# Patient Record
Sex: FEMALE | Race: WHITE | NOT HISPANIC OR LATINO | URBAN - METROPOLITAN AREA
[De-identification: names, ages, dates, MRNs, and addresses within clinical notes are randomized per-mention and may not be internally consistent; named-entity substitution may affect disease eponyms.]

---

## 2023-02-24 LAB
ALANINE AMINOTRANSFERASE (SGPT) (U/L) IN SER/PLAS: 12 U/L (ref 7–45)
ALBUMIN (G/DL) IN SER/PLAS: 4.6 G/DL (ref 3.4–5)
ALKALINE PHOSPHATASE (U/L) IN SER/PLAS: 37 U/L (ref 33–110)
ANION GAP IN SER/PLAS: 12 MMOL/L (ref 10–20)
ASPARTATE AMINOTRANSFERASE (SGOT) (U/L) IN SER/PLAS: 18 U/L (ref 9–39)
BASOPHILS (10*3/UL) IN BLOOD BY AUTOMATED COUNT: 0.05 X10E9/L (ref 0–0.1)
BASOPHILS/100 LEUKOCYTES IN BLOOD BY AUTOMATED COUNT: 1.2 % (ref 0–2)
BILIRUBIN TOTAL (MG/DL) IN SER/PLAS: 0.7 MG/DL (ref 0–1.2)
CALCIUM (MG/DL) IN SER/PLAS: 9.5 MG/DL (ref 8.6–10.6)
CARBON DIOXIDE, TOTAL (MMOL/L) IN SER/PLAS: 28 MMOL/L (ref 21–32)
CHLORIDE (MMOL/L) IN SER/PLAS: 102 MMOL/L (ref 98–107)
COBALAMIN (VITAMIN B12) (PG/ML) IN SER/PLAS: 838 PG/ML (ref 211–911)
CORTISOL (UG/DL) IN SERUM - AM: 6.9 UG/DL (ref 5–20)
CORTISOL (UG/DL) IN SERUM- PM: NORMAL
CREATININE (MG/DL) IN SER/PLAS: 0.64 MG/DL (ref 0.5–1.05)
EOSINOPHILS (10*3/UL) IN BLOOD BY AUTOMATED COUNT: 0.15 X10E9/L (ref 0–0.7)
EOSINOPHILS/100 LEUKOCYTES IN BLOOD BY AUTOMATED COUNT: 3.6 % (ref 0–6)
ERYTHROCYTE DISTRIBUTION WIDTH (RATIO) BY AUTOMATED COUNT: 12 % (ref 11.5–14.5)
ERYTHROCYTE MEAN CORPUSCULAR HEMOGLOBIN CONCENTRATION (G/DL) BY AUTOMATED: 32.8 G/DL (ref 32–36)
ERYTHROCYTE MEAN CORPUSCULAR VOLUME (FL) BY AUTOMATED COUNT: 88 FL (ref 80–100)
ERYTHROCYTES (10*6/UL) IN BLOOD BY AUTOMATED COUNT: 4.51 X10E12/L (ref 4–5.2)
GFR FEMALE: >90 ML/MIN/1.73M2
GLUCOSE (MG/DL) IN SER/PLAS: 85 MG/DL (ref 74–99)
HEMATOCRIT (%) IN BLOOD BY AUTOMATED COUNT: 39.9 % (ref 36–46)
HEMOGLOBIN (G/DL) IN BLOOD: 13.1 G/DL (ref 12–16)
IMMATURE GRANULOCYTES/100 LEUKOCYTES IN BLOOD BY AUTOMATED COUNT: 0.2 % (ref 0–0.9)
IRON (UG/DL) IN SER/PLAS: 74 UG/DL (ref 35–150)
IRON BINDING CAPACITY (UG/DL) IN SER/PLAS: 357 UG/DL (ref 240–445)
IRON SATURATION (%) IN SER/PLAS: 21 % (ref 25–45)
LEUKOCYTES (10*3/UL) IN BLOOD BY AUTOMATED COUNT: 4.2 X10E9/L (ref 4.4–11.3)
LYMPHOCYTES (10*3/UL) IN BLOOD BY AUTOMATED COUNT: 1.69 X10E9/L (ref 1.2–4.8)
LYMPHOCYTES/100 LEUKOCYTES IN BLOOD BY AUTOMATED COUNT: 40.6 % (ref 13–44)
MONOCYTES (10*3/UL) IN BLOOD BY AUTOMATED COUNT: 0.3 X10E9/L (ref 0.1–1)
MONOCYTES/100 LEUKOCYTES IN BLOOD BY AUTOMATED COUNT: 7.2 % (ref 2–10)
NEUTROPHILS (10*3/UL) IN BLOOD BY AUTOMATED COUNT: 1.96 X10E9/L (ref 1.2–7.7)
NEUTROPHILS/100 LEUKOCYTES IN BLOOD BY AUTOMATED COUNT: 47.2 % (ref 40–80)
NRBC (PER 100 WBCS) BY AUTOMATED COUNT: 0 /100 WBC (ref 0–0)
PLATELETS (10*3/UL) IN BLOOD AUTOMATED COUNT: 358 X10E9/L (ref 150–450)
POTASSIUM (MMOL/L) IN SER/PLAS: 5 MMOL/L (ref 3.5–5.3)
PROTEIN TOTAL: 7.1 G/DL (ref 6.4–8.2)
SODIUM (MMOL/L) IN SER/PLAS: 137 MMOL/L (ref 136–145)
THYROPEROXIDASE AB (IU/ML) IN SER/PLAS: <28 IU/ML
THYROTROPIN (MIU/L) IN SER/PLAS BY DETECTION LIMIT <= 0.05 MIU/L: 1.17 MIU/L (ref 0.44–3.98)
UREA NITROGEN (MG/DL) IN SER/PLAS: 10 MG/DL (ref 6–23)

## 2023-02-25 LAB — CORTISOL (UG/DL) IN SERUM- PM: 4.9 UG/DL (ref 2.5–10)

## 2023-02-27 LAB — ZINC,SERUM OR PLASMA: 72.8 UG/DL (ref 60–120)

## 2023-02-28 LAB — VITAMIN B6: 313.3 NMOL/L (ref 20–125)

## 2023-03-01 LAB — VITAMIN B1, WHOLE BLOOD: 122 NMOL/L (ref 70–180)

## 2023-08-17 ENCOUNTER — HOSPITAL ENCOUNTER (OUTPATIENT)
Dept: DATA CONVERSION | Facility: HOSPITAL | Age: 33
Discharge: HOME | End: 2023-08-17
Payer: COMMERCIAL

## 2023-08-17 DIAGNOSIS — R87.623 HIGH GRADE SQUAMOUS INTRAEPITHELIAL LESION ON CYTOLOGIC SMEAR OF VAGINA (HGSIL): ICD-10-CM

## 2023-09-07 ENCOUNTER — HOSPITAL ENCOUNTER (OUTPATIENT)
Dept: DATA CONVERSION | Facility: HOSPITAL | Age: 33
Discharge: HOME | End: 2023-09-07
Payer: COMMERCIAL

## 2023-09-07 DIAGNOSIS — R39.9 UNSPECIFIED SYMPTOMS AND SIGNS INVOLVING THE GENITOURINARY SYSTEM: ICD-10-CM

## 2023-09-07 LAB
BACTERIA SPEC CULT: NORMAL
CC # UR: NORMAL /UL
REPORT STATUS -LH SQ DATA CONVERSION: NORMAL
SERVICE CMNT-IMP: NORMAL
SPECIMEN SOURCE: NORMAL

## 2023-12-11 PROBLEM — R13.19 ESOPHAGEAL DYSPHAGIA: Status: ACTIVE | Noted: 2023-12-11

## 2023-12-11 PROBLEM — R09.81 NASAL CONGESTION WITH RHINORRHEA: Status: ACTIVE | Noted: 2023-12-11

## 2023-12-11 PROBLEM — E55.9 VITAMIN D DEFICIENCY: Status: ACTIVE | Noted: 2023-12-11

## 2023-12-11 PROBLEM — F41.9 ANXIETY: Status: ACTIVE | Noted: 2023-12-11

## 2023-12-11 PROBLEM — K21.9 GASTRIC REFLUX: Status: ACTIVE | Noted: 2023-12-11

## 2023-12-11 PROBLEM — D62 ANEMIA DUE TO ACUTE BLOOD LOSS: Status: ACTIVE | Noted: 2023-12-11

## 2023-12-11 PROBLEM — R53.83 FATIGUE: Status: ACTIVE | Noted: 2023-12-11

## 2023-12-11 PROBLEM — N23 URINARY TRACT PAIN: Status: ACTIVE | Noted: 2023-12-11

## 2023-12-11 PROBLEM — R52 GENERALIZED BODY ACHES: Status: ACTIVE | Noted: 2023-12-11

## 2023-12-11 PROBLEM — J01.41 ACUTE RECURRENT PANSINUSITIS: Status: ACTIVE | Noted: 2023-12-11

## 2023-12-11 PROBLEM — E86.0 DEHYDRATION: Status: RESOLVED | Noted: 2023-12-11 | Resolved: 2023-12-11

## 2023-12-11 PROBLEM — J34.89 NASAL CONGESTION WITH RHINORRHEA: Status: ACTIVE | Noted: 2023-12-11

## 2023-12-11 PROBLEM — N39.0 ACUTE LOWER URINARY TRACT INFECTION: Status: ACTIVE | Noted: 2023-12-11

## 2023-12-11 PROBLEM — J30.9 ALLERGIC RHINITIS: Status: ACTIVE | Noted: 2023-12-11

## 2023-12-11 PROBLEM — N39.0 RECURRENT URINARY TRACT INFECTION: Status: ACTIVE | Noted: 2023-12-11

## 2023-12-11 PROBLEM — J01.90 ACUTE SINUSITIS: Status: ACTIVE | Noted: 2023-12-11

## 2023-12-11 PROBLEM — R05.9 COUGH: Status: ACTIVE | Noted: 2023-12-11

## 2023-12-11 PROBLEM — B37.31 VAGINAL CANDIDIASIS: Status: ACTIVE | Noted: 2023-12-11

## 2023-12-11 PROBLEM — Z86.16 HISTORY OF COVID-19: Status: RESOLVED | Noted: 2023-12-11 | Resolved: 2023-12-11

## 2023-12-11 PROBLEM — N94.6 DYSMENORRHEA: Status: ACTIVE | Noted: 2023-12-11

## 2023-12-11 PROBLEM — L30.9 ECZEMA: Status: ACTIVE | Noted: 2023-12-11

## 2023-12-11 PROBLEM — Z20.822 SUSPECTED COVID-19 VIRUS INFECTION: Status: RESOLVED | Noted: 2023-12-11 | Resolved: 2023-12-11

## 2023-12-11 PROBLEM — F32.A DEPRESSION: Status: ACTIVE | Noted: 2023-12-11

## 2023-12-18 ENCOUNTER — TELEPHONE (OUTPATIENT)
Dept: PRIMARY CARE | Facility: CLINIC | Age: 33
End: 2023-12-18
Payer: COMMERCIAL

## 2023-12-18 NOTE — TELEPHONE ENCOUNTER
Patient has been feeling unwell since September.  She stated it is fatigue and general malaise.  She is coming in for a visit to discuss on 12/26 but would like to know if there is any blood work you can order beforehand so you can discuss it at the appointment.  Please advise.

## 2023-12-18 NOTE — TELEPHONE ENCOUNTER
Called and spoke with pt, who states that she really want bloodwork, to check her fatigue and dizziness. Pt states that she really want to do bloodwork, to see if she is deficient in anything. Pt states that she wants her visit that she coming in to discuss in detail about why pt has been feeling unwell since Sept.

## 2023-12-19 ENCOUNTER — TELEMEDICINE (OUTPATIENT)
Dept: PRIMARY CARE | Facility: CLINIC | Age: 33
End: 2023-12-19
Payer: COMMERCIAL

## 2023-12-19 DIAGNOSIS — R53.83 OTHER FATIGUE: Primary | ICD-10-CM

## 2023-12-19 PROCEDURE — 99213 OFFICE O/P EST LOW 20 MIN: CPT | Performed by: STUDENT IN AN ORGANIZED HEALTH CARE EDUCATION/TRAINING PROGRAM

## 2023-12-19 RX ORDER — NORETHINDRONE ACETATE AND ETHINYL ESTRADIOL .02; 1 MG/1; MG/1
1 TABLET ORAL DAILY
COMMUNITY
Start: 2016-07-21 | End: 2023-12-19 | Stop reason: WASHOUT

## 2023-12-19 ASSESSMENT — PATIENT HEALTH QUESTIONNAIRE - PHQ9
10. IF YOU CHECKED OFF ANY PROBLEMS, HOW DIFFICULT HAVE THESE PROBLEMS MADE IT FOR YOU TO DO YOUR WORK, TAKE CARE OF THINGS AT HOME, OR GET ALONG WITH OTHER PEOPLE: SOMEWHAT DIFFICULT
9. THOUGHTS THAT YOU WOULD BE BETTER OFF DEAD, OR OF HURTING YOURSELF: NOT AT ALL
SUM OF ALL RESPONSES TO PHQ QUESTIONS 1-9: 19
6. FEELING BAD ABOUT YOURSELF - OR THAT YOU ARE A FAILURE OR HAVE LET YOURSELF OR YOUR FAMILY DOWN: NOT AT ALL
SUM OF ALL RESPONSES TO PHQ9 QUESTIONS 1 AND 2: 0
5. POOR APPETITE OR OVEREATING: NEARLY EVERY DAY
3. TROUBLE FALLING OR STAYING ASLEEP OR SLEEPING TOO MUCH: SEVERAL DAYS
7. TROUBLE CONCENTRATING ON THINGS, SUCH AS READING THE NEWSPAPER OR WATCHING TELEVISION: NEARLY EVERY DAY
2. FEELING DOWN, DEPRESSED OR HOPELESS: NOT AT ALL
1. LITTLE INTEREST OR PLEASURE IN DOING THINGS: NOT AT ALL
2. FEELING DOWN, DEPRESSED OR HOPELESS: NEARLY EVERY DAY
1. LITTLE INTEREST OR PLEASURE IN DOING THINGS: NEARLY EVERY DAY
4. FEELING TIRED OR HAVING LITTLE ENERGY: NEARLY EVERY DAY
SUM OF ALL RESPONSES TO PHQ9 QUESTIONS 1 AND 2: 6
8. MOVING OR SPEAKING SO SLOWLY THAT OTHER PEOPLE COULD HAVE NOTICED. OR THE OPPOSITE, BEING SO FIGETY OR RESTLESS THAT YOU HAVE BEEN MOVING AROUND A LOT MORE THAN USUAL: NEARLY EVERY DAY

## 2023-12-19 ASSESSMENT — COLUMBIA-SUICIDE SEVERITY RATING SCALE - C-SSRS
6. HAVE YOU EVER DONE ANYTHING, STARTED TO DO ANYTHING, OR PREPARED TO DO ANYTHING TO END YOUR LIFE?: NO
1. IN THE PAST MONTH, HAVE YOU WISHED YOU WERE DEAD OR WISHED YOU COULD GO TO SLEEP AND NOT WAKE UP?: NO
2. HAVE YOU ACTUALLY HAD ANY THOUGHTS OF KILLING YOURSELF?: NO

## 2023-12-19 NOTE — PROGRESS NOTES
"Subjective   Patient ID: Sima Argueta is a 33 y.o. female who presents virtually for Fatigue (Pt has been feeling fatigue, feeling low energy even for a full sleep, \"hung over feeling,\" everyday since Sept / nr) and Numbness (Pt states that she had an episode of numbness all on the right side arm and right eye tunnel vision. Pt had called 911, but did not go to the hospital, which was on Nov. 10th ).    HPI  32 yo female presents virtually for fatigue  Fatigue  Since September, wakes up and feels fatigued and unwell  After good night's sleep still feels this way  Drinks plenty of water, eats well  Had an episode of visual disturbance, numbness in R arm  Not feeling sick, no fevers, no vomiting    Review of Systems  All pertinent positive symptoms are included in the history of present illness.    All other systems have been reviewed and are negative and noncontributory to this patient's current ailments.     Allergies   Allergen Reactions    Sulfamethoxazole-Trimethoprim Anaphylaxis and Diarrhea          There is no immunization history on file for this patient.    Objective   There were no vitals filed for this visit.    Physical Exam Virtual visit  CONSTITUTIONAL - well nourished, well developed, looks like stated age, in no acute distress, not ill-appearing, and not tired appearing  SKIN - normal skin color and pigmentation, normal skin turgor without rash, lesions, or nodules visualized  HEAD - no trauma, normocephalic  ENT - atraumatic  CHEST - non labored breathing  PSYCHIATRIC - alert, pleasant and cordial, age-appropriate    Assessment/Plan   32 yo female presents virtually for fatigue  Fatigue  Lab work    RTC in 1 week for lab review  "

## 2023-12-26 PROBLEM — Z91.89 AT RISK FOR BLEEDING: Status: RESOLVED | Noted: 2023-12-26 | Resolved: 2023-12-26

## 2024-01-10 ENCOUNTER — TELEPHONE (OUTPATIENT)
Dept: OPHTHALMOLOGY | Facility: CLINIC | Age: 34
End: 2024-01-10
Payer: COMMERCIAL

## 2024-02-08 ENCOUNTER — HOSPITAL ENCOUNTER (EMERGENCY)
Facility: HOSPITAL | Age: 34
Discharge: HOME | End: 2024-02-08
Attending: EMERGENCY MEDICINE
Payer: COMMERCIAL

## 2024-02-08 VITALS
SYSTOLIC BLOOD PRESSURE: 124 MMHG | WEIGHT: 105 LBS | RESPIRATION RATE: 15 BRPM | DIASTOLIC BLOOD PRESSURE: 62 MMHG | HEART RATE: 69 BPM | HEIGHT: 60 IN | BODY MASS INDEX: 20.62 KG/M2 | TEMPERATURE: 98.2 F | OXYGEN SATURATION: 100 %

## 2024-02-08 DIAGNOSIS — B34.9 VIRAL SYNDROME: Primary | ICD-10-CM

## 2024-02-08 LAB
ALBUMIN SERPL-MCNC: 4.4 G/DL (ref 3.5–5)
ALP BLD-CCNC: 46 U/L (ref 35–125)
ALT SERPL-CCNC: 6 U/L (ref 5–40)
ANION GAP SERPL CALC-SCNC: 9 MMOL/L
APPEARANCE UR: CLEAR
AST SERPL-CCNC: 14 U/L (ref 5–40)
BASOPHILS # BLD AUTO: 0.06 X10*3/UL (ref 0–0.1)
BASOPHILS NFR BLD AUTO: 0.9 %
BILIRUB SERPL-MCNC: 0.2 MG/DL (ref 0.1–1.2)
BILIRUB UR STRIP.AUTO-MCNC: NEGATIVE MG/DL
BUN SERPL-MCNC: 12 MG/DL (ref 8–25)
CALCIUM SERPL-MCNC: 9.1 MG/DL (ref 8.5–10.4)
CHLORIDE SERPL-SCNC: 102 MMOL/L (ref 97–107)
CO2 SERPL-SCNC: 26 MMOL/L (ref 24–31)
COLOR UR: COLORLESS
CREAT SERPL-MCNC: 0.7 MG/DL (ref 0.4–1.6)
EGFRCR SERPLBLD CKD-EPI 2021: >90 ML/MIN/1.73M*2
EOSINOPHIL # BLD AUTO: 0.08 X10*3/UL (ref 0–0.7)
EOSINOPHIL NFR BLD AUTO: 1.2 %
ERYTHROCYTE [DISTWIDTH] IN BLOOD BY AUTOMATED COUNT: 12.4 % (ref 11.5–14.5)
FLUAV RNA RESP QL NAA+PROBE: NOT DETECTED
FLUBV RNA RESP QL NAA+PROBE: NOT DETECTED
GLUCOSE SERPL-MCNC: 94 MG/DL (ref 65–99)
GLUCOSE UR STRIP.AUTO-MCNC: NORMAL MG/DL
HCG UR QL IA.RAPID: NEGATIVE
HCT VFR BLD AUTO: 38.8 % (ref 36–46)
HGB BLD-MCNC: 13.2 G/DL (ref 12–16)
IMM GRANULOCYTES # BLD AUTO: 0.01 X10*3/UL (ref 0–0.7)
IMM GRANULOCYTES NFR BLD AUTO: 0.2 % (ref 0–0.9)
KETONES UR STRIP.AUTO-MCNC: NEGATIVE MG/DL
LEUKOCYTE ESTERASE UR QL STRIP.AUTO: NEGATIVE
LYMPHOCYTES # BLD AUTO: 3.1 X10*3/UL (ref 1.2–4.8)
LYMPHOCYTES NFR BLD AUTO: 48.4 %
MAGNESIUM SERPL-MCNC: 2.1 MG/DL (ref 1.6–3.1)
MCH RBC QN AUTO: 29.1 PG (ref 26–34)
MCHC RBC AUTO-ENTMCNC: 34 G/DL (ref 32–36)
MCV RBC AUTO: 86 FL (ref 80–100)
MONOCYTES # BLD AUTO: 0.37 X10*3/UL (ref 0.1–1)
MONOCYTES NFR BLD AUTO: 5.8 %
NEUTROPHILS # BLD AUTO: 2.79 X10*3/UL (ref 1.2–7.7)
NEUTROPHILS NFR BLD AUTO: 43.5 %
NITRITE UR QL STRIP.AUTO: NEGATIVE
NRBC BLD-RTO: 0 /100 WBCS (ref 0–0)
PH UR STRIP.AUTO: 6.5 [PH]
PLATELET # BLD AUTO: 326 X10*3/UL (ref 150–450)
POTASSIUM SERPL-SCNC: 3.7 MMOL/L (ref 3.4–5.1)
PROT SERPL-MCNC: 7.2 G/DL (ref 5.9–7.9)
PROT UR STRIP.AUTO-MCNC: NEGATIVE MG/DL
RBC # BLD AUTO: 4.53 X10*6/UL (ref 4–5.2)
RBC # UR STRIP.AUTO: ABNORMAL /UL
RBC #/AREA URNS AUTO: >20 /HPF
SARS-COV-2 RNA RESP QL NAA+PROBE: NOT DETECTED
SODIUM SERPL-SCNC: 137 MMOL/L (ref 133–145)
SP GR UR STRIP.AUTO: 1.01
UROBILINOGEN UR STRIP.AUTO-MCNC: NORMAL MG/DL
WBC # BLD AUTO: 6.4 X10*3/UL (ref 4.4–11.3)
WBC #/AREA URNS AUTO: ABNORMAL /HPF

## 2024-02-08 PROCEDURE — 99283 EMERGENCY DEPT VISIT LOW MDM: CPT | Mod: 25

## 2024-02-08 PROCEDURE — 85025 COMPLETE CBC W/AUTO DIFF WBC: CPT | Performed by: EMERGENCY MEDICINE

## 2024-02-08 PROCEDURE — 80053 COMPREHEN METABOLIC PANEL: CPT | Performed by: EMERGENCY MEDICINE

## 2024-02-08 PROCEDURE — 81025 URINE PREGNANCY TEST: CPT | Performed by: EMERGENCY MEDICINE

## 2024-02-08 PROCEDURE — 87636 SARSCOV2 & INF A&B AMP PRB: CPT | Performed by: EMERGENCY MEDICINE

## 2024-02-08 PROCEDURE — 83735 ASSAY OF MAGNESIUM: CPT | Performed by: EMERGENCY MEDICINE

## 2024-02-08 PROCEDURE — 36415 COLL VENOUS BLD VENIPUNCTURE: CPT | Performed by: EMERGENCY MEDICINE

## 2024-02-08 PROCEDURE — 99284 EMERGENCY DEPT VISIT MOD MDM: CPT | Performed by: EMERGENCY MEDICINE

## 2024-02-08 PROCEDURE — 2500000004 HC RX 250 GENERAL PHARMACY W/ HCPCS (ALT 636 FOR OP/ED): Performed by: EMERGENCY MEDICINE

## 2024-02-08 PROCEDURE — 96360 HYDRATION IV INFUSION INIT: CPT

## 2024-02-08 PROCEDURE — 81001 URINALYSIS AUTO W/SCOPE: CPT | Performed by: EMERGENCY MEDICINE

## 2024-02-08 RX ORDER — ONDANSETRON 4 MG/1
4 TABLET, ORALLY DISINTEGRATING ORAL EVERY 8 HOURS PRN
Qty: 8 TABLET | Refills: 0 | Status: SHIPPED | OUTPATIENT
Start: 2024-02-08 | End: 2024-02-15

## 2024-02-08 RX ADMIN — SODIUM CHLORIDE, SODIUM LACTATE, POTASSIUM CHLORIDE, AND CALCIUM CHLORIDE 1000 ML: 600; 310; 30; 20 INJECTION, SOLUTION INTRAVENOUS at 18:35

## 2024-02-08 ASSESSMENT — PAIN SCALES - GENERAL: PAINLEVEL_OUTOF10: 0 - NO PAIN

## 2024-02-08 ASSESSMENT — PAIN - FUNCTIONAL ASSESSMENT: PAIN_FUNCTIONAL_ASSESSMENT: 0-10

## 2024-02-08 ASSESSMENT — PAIN DESCRIPTION - PROGRESSION: CLINICAL_PROGRESSION: NOT CHANGED

## 2024-02-08 NOTE — ED PROVIDER NOTES
HPI   Chief Complaint   Patient presents with    Weakness, Gen     Pt came to the ed for weakness and not feeling well.         The patient is a 33-year-old female who reports that for the past 1 to 2 weeks she just has not been feeling well.  Today she went to an urgent care center and she was directed to come to the emergency department to be evaluated for possible dehydration.  Patient states that she really does not feel ill but she states that every morning over the past 1 to 2 weeks she has been waking up feeling like she is hung over.  She has not been drinking any alcohol.  She states that she feels sleepy and fatigued and out of energy.  She has been having nausea but no vomiting.  She has been having some mild headaches that come and go.  She has been having rather frequent watery diarrhea.  No bloody diarrhea.  She denies any abdominal pain.  No chest congestion or cough.  No sore throat.                          Orange Coma Scale Score: 15                     Patient History   Past Medical History:   Diagnosis Date    Acute candidiasis of vulva and vagina 07/21/2016    Vaginal candidiasis    Acute pharyngitis, unspecified 11/04/2013    Sore throat    At risk for bleeding 12/26/2023    Dehydration 12/11/2023    Delivery normal 12/11/2023    Encounter for initial prescription of contraceptive pills 07/21/2016    Visit for oral contraceptive prescription    Hemorrhage of anus and rectum 06/12/2013    Rectal hemorrhage    History of COVID-19 12/11/2023    Nausea 02/26/2014    Nausea    Personal history of other diseases of the respiratory system 12/30/2013    History of influenza    Personal history of other infectious and parasitic diseases 06/13/2013    History of viral illness    Personal history of other specified conditions 02/26/2014    History of fatigue    Postpartum care and examination 12/26/2023    Tinnitus, right ear 05/10/2016    Right-sided tinnitus    Unspecified condition associated with  female genital organs and menstrual cycle 07/21/2016    Vaginal burning     History reviewed. No pertinent surgical history.  No family history on file.  Social History     Tobacco Use    Smoking status: Never    Smokeless tobacco: Never   Substance Use Topics    Alcohol use: Not on file    Drug use: Not on file       Physical Exam   ED Triage Vitals [02/08/24 1744]   Temperature Heart Rate Respirations BP   36.8 °C (98.2 °F) 78 18 126/54      Pulse Ox Temp Source Heart Rate Source Patient Position   100 % Oral Monitor Sitting      BP Location FiO2 (%)     Left arm --       Physical Exam  Vitals and nursing note reviewed.   Constitutional:       General: She is not in acute distress.     Appearance: Normal appearance.   HENT:      Head: Normocephalic and atraumatic.      Mouth/Throat:      Mouth: Mucous membranes are moist.      Pharynx: Oropharynx is clear. No oropharyngeal exudate or posterior oropharyngeal erythema.   Eyes:      General:         Right eye: No discharge.         Left eye: No discharge.      Extraocular Movements: Extraocular movements intact.      Conjunctiva/sclera: Conjunctivae normal.      Pupils: Pupils are equal, round, and reactive to light.   Cardiovascular:      Rate and Rhythm: Normal rate and regular rhythm.      Heart sounds: No murmur heard.  Pulmonary:      Effort: Pulmonary effort is normal.      Breath sounds: Normal breath sounds. No wheezing, rhonchi or rales.   Abdominal:      General: Abdomen is flat. There is no distension.      Palpations: Abdomen is soft.      Tenderness: There is no abdominal tenderness.   Musculoskeletal:      Cervical back: Normal range of motion and neck supple. No rigidity or tenderness.   Lymphadenopathy:      Cervical: No cervical adenopathy.   Skin:     General: Skin is warm and dry.      Findings: No rash.   Neurological:      Mental Status: She is alert.   Psychiatric:         Mood and Affect: Mood normal.         Behavior: Behavior normal.  "        ED Course & MDM   Diagnoses as of 02/08/24 2015   Viral syndrome       Medical Decision Making  The patient was treated with 1 L of IV lactated Ringer's and upon reassessment states that she feels much better.  She appears to have more \"pep in her step\".  She is smiling.  Labs were reviewed.  CBC and CMP were unremarkable.  Magnesium was within normal range.  Pregnancy test was negative.  The patient tested negative for influenza and COVID.  Urinalysis shows 0 white blood cells and greater than 20 red blood cells.  I suspect that this is contamination as the patient is currently menstruating.  No signs of UTI.    Overall I suspect a viral syndrome with diarrhea and nausea and general malaise.  No signs of sepsis.  No electrolyte derangements.  Renal function is within normal range.  Patient may have had some degree of dehydration with decreased oral intake and frequent diarrhea.  She will be discharged home with prescription for Zofran and encouraged to drink plenty of fluids.  She was given a primary care referral for follow-up in 2 to 3 days and otherwise instructed to return to the emergency department if feeling worse in any way.        Procedure  Procedures     Viraj Alas,   02/08/24 2015    "

## 2024-02-09 NOTE — DISCHARGE INSTRUCTIONS
Drink plenty of fluids.    Plenty of rest.    Follow-up with the PCP below for recheck in 2 to 3 days.

## 2024-03-01 PROBLEM — R09.81 NASAL CONGESTION WITH RHINORRHEA: Status: RESOLVED | Noted: 2023-12-11 | Resolved: 2024-03-01

## 2024-03-01 PROBLEM — J34.89 NASAL CONGESTION WITH RHINORRHEA: Status: RESOLVED | Noted: 2023-12-11 | Resolved: 2024-03-01

## 2024-03-01 PROBLEM — R05.9 COUGH: Status: RESOLVED | Noted: 2023-12-11 | Resolved: 2024-03-01

## 2024-03-01 PROBLEM — J01.90 ACUTE SINUSITIS: Status: RESOLVED | Noted: 2023-12-11 | Resolved: 2024-03-01

## 2024-03-01 PROBLEM — R87.623 PAPANICOLAOU SMEAR OF VAGINA WITH HIGH GRADE SQUAMOUS INTRAEPITHELIAL LESION (HGSIL): Status: ACTIVE | Noted: 2023-08-17

## 2024-03-01 PROBLEM — N39.0 ACUTE LOWER URINARY TRACT INFECTION: Status: RESOLVED | Noted: 2023-12-11 | Resolved: 2024-03-01

## 2024-03-01 PROBLEM — U09.9 CHRONIC POST-COVID-19 SYNDROME: Status: ACTIVE | Noted: 2024-03-01

## 2024-03-01 PROBLEM — J01.41 ACUTE RECURRENT PANSINUSITIS: Status: RESOLVED | Noted: 2023-12-11 | Resolved: 2024-03-01

## 2024-03-01 PROBLEM — N23 URINARY TRACT PAIN: Status: RESOLVED | Noted: 2023-12-11 | Resolved: 2024-03-01

## 2024-03-01 PROBLEM — B37.31 VAGINAL CANDIDIASIS: Status: RESOLVED | Noted: 2023-12-11 | Resolved: 2024-03-01

## 2024-04-16 ENCOUNTER — OFFICE VISIT (OUTPATIENT)
Dept: OTOLARYNGOLOGY | Facility: CLINIC | Age: 34
End: 2024-04-16
Payer: COMMERCIAL

## 2024-04-16 ENCOUNTER — CLINICAL SUPPORT (OUTPATIENT)
Dept: AUDIOLOGY | Facility: CLINIC | Age: 34
End: 2024-04-16
Payer: COMMERCIAL

## 2024-04-16 VITALS — WEIGHT: 105 LBS | BODY MASS INDEX: 20.62 KG/M2 | HEIGHT: 60 IN

## 2024-04-16 DIAGNOSIS — H93.12 TINNITUS OF LEFT EAR: ICD-10-CM

## 2024-04-16 DIAGNOSIS — R42 VERTIGO: Primary | ICD-10-CM

## 2024-04-16 DIAGNOSIS — Z01.10 ENCOUNTER FOR HEARING EXAMINATION WITHOUT ABNORMAL FINDINGS: Primary | ICD-10-CM

## 2024-04-16 DIAGNOSIS — H93.8X2 PRESSURE SENSATION IN LEFT EAR: ICD-10-CM

## 2024-04-16 PROCEDURE — 92550 TYMPANOMETRY & REFLEX THRESH: CPT | Performed by: AUDIOLOGIST

## 2024-04-16 PROCEDURE — 1036F TOBACCO NON-USER: CPT | Performed by: OTOLARYNGOLOGY

## 2024-04-16 PROCEDURE — 99203 OFFICE O/P NEW LOW 30 MIN: CPT | Performed by: OTOLARYNGOLOGY

## 2024-04-16 PROCEDURE — 92557 COMPREHENSIVE HEARING TEST: CPT | Performed by: AUDIOLOGIST

## 2024-04-16 NOTE — PROGRESS NOTES
AUDIOLOGY ADULT AUDIOMETRIC EVALUATION    Name:  Sima Argueta  :  1990  Age:  33 y.o.  Date of Evaluation:  2024    Reason for visit: Ms. Argueta is seen in the clinic today at the request of Rajat Duran MD in otolaryngology for an audiologic evaluation.     EVALUATION  See scanned audiogram: “Media” > “Audiology Report” > Document ID [id number].      RESULTS  Otoscopic Evaluation  Right Ear: minimal non-occluding cerumen with visualization of the tympanic membrane  Left Ear: minimal non-occluding cerumen with visualization of the tympanic membrane    Immittance Measures  Tympanometry:  Right Ear: Type A, normal tympanic membrane mobility with normal middle ear pressure  Left Ear: Type A, normal tympanic membrane mobility with normal middle ear pressure    Acoustic Reflexes:  Ipsilateral Right Ear: present and normal from 500 Hz to 4000 Hz  Ipsilateral Left Ear: present and normal from 500 Hz to 4000 Hz  Contralateral Right Ear: did not evaluate  Contralateral Left Ear: did not evaluate    Distortion Product Otoacoustic Emissions (DPOAEs)  Right Ear: present from 1000 Hz to 6000 Hz, absent at 8000 Hz  Left Ear: present from 1000 Hz to 6000 Hz, absent at 8000 Hz    Audiometry  Test Technique and Reliability:   Standard audiometry via supra-aural headphones. Pulsed tone stimulus. Reliability is good.    Pure tone air and bone conduction audiometry:  Right Ear: normal hearing sensitivity  Left Ear: normal hearing sensitivity    Speech Audiometry:  Speech Reception Threshold (SRT) Right Ear: 10 dB HL  Speech Reception Threshold (SRT) Left Ear: 5 dB HL  Word Recognition Score (WRS) Right Ear: Excellent, 100% at 50 dB HL  Word Recognition Score (WRS) Left Ear: Excellent, 100% at 45 dB HL     IMPRESSIONS  Audiometric evaluation revealed normal hearing sensitivity bilaterally. Tympanometry indicates normal tympanic membrane mobility with normal middle ear pressure bilaterally. No prior  audiologic evaluation is available for comparison. The presence of acoustic reflexes within normal intensity limits is consistent with normal middle ear and brainstem function, and suggests that auditory sensitivity is not significantly impaired. Present Distortion Product Otoacoustic Emissions (DPOAEs) suggest normal/near normal cochlear outer hair cell function and are consistent with no greater than a mild hearing loss at those frequencies.     RECOMMENDATIONS  - Follow up with otolaryngology today as scheduled.  - Audiologic evaluation as needed.  - Follow-up with medical care team as planned.    PATIENT EDUCATION  Discussed results, impressions and recommendations with the patient. Questions were addressed and the patient was encouraged to contact our office should concerns arise.    Time for this encounter: 830-900    Abelardo Adams, CCC-A  Licensed Audiologist

## 2024-04-16 NOTE — PROGRESS NOTES
Chief Complaint   Patient presents with    New Patient Visit     NP- PREVIOUS LEFT EAR INFECTION, FLUID IN EARS HAD AUDIO DONE     HPI:  Sima Argueta is a 33 y.o. female about a month ago she had an upper respiratory infection.  She was urgent care was told she had left ear infection and fluid in the ear and was treated with a Z-Bryon.  Pain resolved but she now continues to have problems with left ear pressure and plugged sensation, tinnitus, and a sensation of hearing loss.  She also is dizzy daily described as a fairly continuous sensation of swaying like she is on a boat with some mild nausea.  No current treatment other than Flonase.  She did have an audiogram today which is normal.  No prior otologic history    PMH:  Past Medical History:   Diagnosis Date    Acute candidiasis of vulva and vagina 07/21/2016    Vaginal candidiasis    Acute pharyngitis, unspecified 11/04/2013    Sore throat    At risk for bleeding 12/26/2023    Dehydration 12/11/2023    Delivery normal ([facility]) 12/11/2023    Encounter for initial prescription of contraceptive pills 07/21/2016    Visit for oral contraceptive prescription    Hemorrhage of anus and rectum 06/12/2013    Rectal hemorrhage    History of COVID-19 12/11/2023    Nausea 02/26/2014    Nausea    Personal history of other diseases of the respiratory system 12/30/2013    History of influenza    Personal history of other infectious and parasitic diseases 06/13/2013    History of viral illness    Personal history of other specified conditions 02/26/2014    History of fatigue    Postpartum care and examination ([facility]) 12/26/2023    Tinnitus, right ear 05/10/2016    Right-sided tinnitus    Unspecified condition associated with female genital organs and menstrual cycle 07/21/2016    Vaginal burning     History reviewed. No pertinent surgical history.      Medications:     Current Outpatient Medications:     ergocalciferol (Vitamin D-2) 1.25 MG (79817 UT) capsule, Take 1  capsule (50,000 Units) by mouth 1 (one) time per week., Disp: , Rfl:     fluticasone (Flonase Sensimist) 27.5 mcg/actuation nasal spray, Administer 1 spray into each nostril once daily., Disp: 10 g, Rfl: 2    ketoconazole (NIZOral) 2 % shampoo, Apply topically 1 (one) time per week., Disp: , Rfl:     lansoprazole (Prevacid) 30 mg DR capsule, Take 1 capsule (30 mg) by mouth once daily., Disp: , Rfl:     multivitamin tablet, Take 1 tablet by mouth once daily., Disp: , Rfl:     norethindrone ac-eth estradioL (Junel 1/20, 21,) 1-20 mg-mcg tablet, Take 1 tablet by mouth once daily., Disp: , Rfl:      Allergies:  Allergies   Allergen Reactions    Sulfamethoxazole-Trimethoprim Anaphylaxis and Diarrhea        ROS:  Review of systems normal unless stated otherwise in the HPI and/or PMH.    Physical Exam:  Height 1.524 m (5'), weight 47.6 kg (105 lb). Body mass index is 20.51 kg/m².     GENERAL APPEARANCE: Well developed and well nourished.  Alert and oriented in no acute distress.  Normal vocal quality.      HEAD/FACE: No erythema or edema or facial tenderness.  Normal facial nerve function bilaterally.    EAR:       EXTERNAL: Normal pinnas and external auditory canals without lesion or obstructing wax.       MIDDLE EAR: Tympanic membranes intact and mobile with normal landmarks.  Middle ear space appears well aerated.       TUBE STATUS: N/A       MASTOID CAVITY: N/A       HEARING: Gross hearing assessment is within normal limits.      NOSE:       VISUALIZED USING: Anterior rhinoscopy with headlight and nasal speculum.       DORSUM: Midline, nontraumatic appearance.       MUCOSA: Normal-appearing.       SECRETIONS: Normal.       SEPTUM: Midline and nonobstructing.       INFERIOR TURBINATES: Normal.       MIDDLE TURBINATES/MEATUS: N/A       BLEEDING: N/A         ORAL CAVITY/PHARYNX:       TEETH: Adequate dentition.       TONGUE: No mass or lesion.  Normal mobility.       FLOOR OF MOUTH: No mass or lesion.       PALATE: Normal  hard palate, soft palate, and uvula.       OROPHARYNX: Normal without mass or lesion.       BUCCAL MUCOSA/GBS: Normal without mass or lesion.       LIPS: Normal.    LARYNX/HYPOPHARYNX/NASOPHARYNX: N/A    NECK: No palpable masses or abnormal adenopathy.  Trachea is midline.    THYROID: No thyromegaly or palpable nodule.    SALIVARY GLANDS: Normal bilateral parotid and submandibular glands by inspection and palpation.    TMJ's: Normal.    NEURO: Cranial nerve exam grossly normal bilaterally.       Assessment/Plan   Sima was seen today for new patient visit.  Diagnoses and all orders for this visit:  Vertigo (Primary)  -     Electrocochleography; Future  -     Vestibular Testing; Future  Tinnitus of left ear  -     Electrocochleography; Future  -     Vestibular Testing; Future  Pressure sensation in left ear     Her symptoms are that of some left vestibular pathology.  Potential causes could be postviral infection, inflammation, autoimmune, vascular, neoplastic.  Ménière's disease a possibility as well with her left ear symptoms and her subjective complaints of some fluctuation of her left-sided hearing loss.  Will start with a VNG and ECOG for further testing and have instructed her on eating a low-sodium diet.  She will follow-up after testing is done for recheck.  She is aware that we may need further testing such as MRI imaging.  Follow up for test results after testing is complete.     Rajat Duran MD

## 2024-05-17 PROCEDURE — 88175 CYTOPATH C/V AUTO FLUID REDO: CPT

## 2024-05-17 PROCEDURE — 87624 HPV HI-RISK TYP POOLED RSLT: CPT

## 2024-05-17 PROCEDURE — 88141 CYTOPATH C/V INTERPRET: CPT | Performed by: PATHOLOGY

## 2024-05-21 ENCOUNTER — LAB REQUISITION (OUTPATIENT)
Dept: LAB | Facility: HOSPITAL | Age: 34
End: 2024-05-21
Payer: COMMERCIAL

## 2024-05-21 DIAGNOSIS — N87.0 MILD CERVICAL DYSPLASIA: ICD-10-CM

## 2024-05-21 DIAGNOSIS — R87.810 CERVICAL HIGH RISK HUMAN PAPILLOMAVIRUS (HPV) DNA TEST POSITIVE: ICD-10-CM

## 2024-05-21 DIAGNOSIS — Z11.51 ENCOUNTER FOR SCREENING FOR HUMAN PAPILLOMAVIRUS (HPV): ICD-10-CM

## 2024-07-26 ENCOUNTER — HOSPITAL ENCOUNTER (OUTPATIENT)
Dept: RADIOLOGY | Facility: CLINIC | Age: 34
Discharge: HOME | End: 2024-07-26
Payer: COMMERCIAL

## 2024-07-26 VITALS — BODY MASS INDEX: 20.51 KG/M2 | WEIGHT: 105 LBS

## 2024-07-26 DIAGNOSIS — Z01.419 ENCOUNTER FOR GYNECOLOGICAL EXAMINATION (GENERAL) (ROUTINE) WITHOUT ABNORMAL FINDINGS: ICD-10-CM

## 2024-07-26 PROCEDURE — 77067 SCR MAMMO BI INCL CAD: CPT

## 2024-12-17 ENCOUNTER — OFFICE VISIT (OUTPATIENT)
Dept: URGENT CARE | Age: 34
End: 2024-12-17
Payer: COMMERCIAL

## 2024-12-17 VITALS
OXYGEN SATURATION: 98 % | HEIGHT: 60 IN | BODY MASS INDEX: 20.03 KG/M2 | TEMPERATURE: 98 F | DIASTOLIC BLOOD PRESSURE: 78 MMHG | RESPIRATION RATE: 18 BRPM | SYSTOLIC BLOOD PRESSURE: 109 MMHG | WEIGHT: 102 LBS | HEART RATE: 88 BPM

## 2024-12-17 DIAGNOSIS — J02.9 SORE THROAT: Primary | ICD-10-CM

## 2024-12-17 DIAGNOSIS — B34.9 VIRAL SYNDROME: ICD-10-CM

## 2024-12-17 LAB
POC RAPID INFLUENZA A: NEGATIVE
POC RAPID INFLUENZA B: NEGATIVE
POC RAPID STREP: NEGATIVE

## 2024-12-17 PROCEDURE — 99213 OFFICE O/P EST LOW 20 MIN: CPT | Performed by: EMERGENCY MEDICINE

## 2024-12-17 PROCEDURE — 3008F BODY MASS INDEX DOCD: CPT | Performed by: EMERGENCY MEDICINE

## 2024-12-17 PROCEDURE — 1036F TOBACCO NON-USER: CPT | Performed by: EMERGENCY MEDICINE

## 2024-12-17 PROCEDURE — 87804 INFLUENZA ASSAY W/OPTIC: CPT | Performed by: EMERGENCY MEDICINE

## 2024-12-17 PROCEDURE — 87880 STREP A ASSAY W/OPTIC: CPT | Performed by: EMERGENCY MEDICINE

## 2024-12-17 ASSESSMENT — ENCOUNTER SYMPTOMS
SORE THROAT: 1
COUGH: 1
HEADACHES: 1

## 2024-12-17 NOTE — PROGRESS NOTES
Subjective   Patient ID: Sima Argueta is a 34 y.o. female. They present today with a chief complaint of Headache, Earache, Cough, Sore Throat, and Nasal Congestion (Flu-like symptoms x 4 days.).    History of Present Illness    Headache  Associated symptoms: cough, ear pain and sore throat    Earache   Associated symptoms include coughing, headaches and a sore throat.   Cough  Associated symptoms include ear pain, headaches and a sore throat.   Sore Throat   Associated symptoms include coughing, ear pain and headaches.     This is a 34-year-old female presents today complaining of generalized body ache with associated earache headache cough sore throat nasal congestion for the past 4 days.  She denies any fever or chills.  She denies any nausea or vomiting she denies any abdominal pain.  She denies any shortness of breath.  Past Medical History  Allergies as of 12/17/2024 - Reviewed 12/17/2024   Allergen Reaction Noted    Sulfamethoxazole-trimethoprim Anaphylaxis and Diarrhea 04/09/2018       (Not in a hospital admission)       Past Medical History:   Diagnosis Date    Acute candidiasis of vulva and vagina 07/21/2016    Vaginal candidiasis    Acute pharyngitis, unspecified 11/04/2013    Sore throat    At risk for bleeding 12/26/2023    Dehydration 12/11/2023    Delivery normal ([facility]) 12/11/2023    Encounter for initial prescription of contraceptive pills 07/21/2016    Visit for oral contraceptive prescription    Hemorrhage of anus and rectum 06/12/2013    Rectal hemorrhage    History of COVID-19 12/11/2023    Nausea 02/26/2014    Nausea    Personal history of other diseases of the respiratory system 12/30/2013    History of influenza    Personal history of other infectious and parasitic diseases 06/13/2013    History of viral illness    Personal history of other specified conditions 02/26/2014    History of fatigue    Postpartum care and examination ([facility]) 12/26/2023    Tinnitus, right ear 05/10/2016     Right-sided tinnitus    Unspecified condition associated with female genital organs and menstrual cycle 07/21/2016    Vaginal burning       History reviewed. No pertinent surgical history.     reports that she has never smoked. She has never used smokeless tobacco.    Review of Systems  Review of Systems   HENT:  Positive for ear pain and sore throat.    Respiratory:  Positive for cough.    Neurological:  Positive for headaches.   All other systems reviewed and are negative.                                 Objective    Vitals:    12/17/24 1229   BP: 109/78   BP Location: Left arm   Patient Position: Sitting   BP Cuff Size: Small adult   Pulse: 88   Resp: 18   Temp: 36.7 °C (98 °F)   TempSrc: Oral   SpO2: 98%   Weight: 46.3 kg (102 lb)   Height: 1.524 m (5')     No LMP recorded.    Physical Exam  Patient is awake alert oriented x 3 in no acute distress vital signs are stable.  She is afebrile.  Neck supple.  Nontoxic-appearing.  Well-hydrated.  EACs are patent TMs are intact throat slightly erythematous no anterior cervical adenopathy  Cardiovascular is regular rate rhythm  Lungs are clear throughout.  Procedures    Point of Care Test & Imaging Results from this visit  Results for orders placed or performed in visit on 12/17/24   POCT Influenza A/B manually resulted   Result Value Ref Range    POC Rapid Influenza A Negative Negative    POC Rapid Influenza B Negative Negative   POCT rapid strep A manually resulted   Result Value Ref Range    POC Rapid Strep Negative Negative      No results found.    Diagnostic study results (if any) were reviewed by Tenzin Harper DO.    Assessment/Plan   Allergies, medications, history, and pertinent labs/EKGs/Imaging reviewed by Tenzin Harper DO.     Medical Decision Making      Orders and Diagnoses  Diagnoses and all orders for this visit:  Sore throat  -     POCT Influenza A/B manually resulted  -     POCT rapid strep A manually resulted      Medical Admin  Record      Patient disposition: Home    Electronically signed by Tenzin Harper DO  1:02 PM

## 2024-12-30 ENCOUNTER — TELEPHONE (OUTPATIENT)
Dept: PRIMARY CARE | Facility: CLINIC | Age: 34
End: 2024-12-30
Payer: COMMERCIAL

## 2024-12-30 DIAGNOSIS — E55.9 VITAMIN D DEFICIENCY: ICD-10-CM

## 2024-12-30 DIAGNOSIS — Z13.1 SCREENING FOR DIABETES MELLITUS: Primary | ICD-10-CM

## 2024-12-30 NOTE — TELEPHONE ENCOUNTER
Patient is scheduled for annual cpe in feb. She is asking for blood work orders to be placed prior

## 2025-01-01 NOTE — TELEPHONE ENCOUNTER
Lab orders placed for BMP (will check kidney, electrolytes, and blood sugar) and vitamin D levels. Had thyroid checked in the past which was normal, does not need to be repeated. Cholesterol in the past was excellent and does not need to be repeated at this time as well. Let me know if she's okay with that or if she wants these tests repeated and I can order them. Just not absolutely necessary. Does need to be fasting for labs.

## 2025-01-02 NOTE — TELEPHONE ENCOUNTER
Called and spoke with pt, who verbally understands. Pt is okay not to add the additional labs since they are not necessary.

## 2025-01-20 ENCOUNTER — LAB (OUTPATIENT)
Dept: LAB | Facility: LAB | Age: 35
End: 2025-01-20
Payer: COMMERCIAL

## 2025-01-20 DIAGNOSIS — Z13.1 SCREENING FOR DIABETES MELLITUS: ICD-10-CM

## 2025-01-20 DIAGNOSIS — N91.2 AMENORRHEA: ICD-10-CM

## 2025-01-20 DIAGNOSIS — E55.9 VITAMIN D DEFICIENCY: ICD-10-CM

## 2025-01-20 LAB
ANION GAP SERPL CALCULATED.3IONS-SCNC: 11 MMOL/L (ref 10–20)
BUN SERPL-MCNC: 12 MG/DL (ref 6–23)
CALCIUM SERPL-MCNC: 8.8 MG/DL (ref 8.6–10.3)
CHLORIDE SERPL-SCNC: 103 MMOL/L (ref 98–107)
CO2 SERPL-SCNC: 27 MMOL/L (ref 21–32)
CREAT SERPL-MCNC: 0.64 MG/DL (ref 0.5–1.05)
EGFRCR SERPLBLD CKD-EPI 2021: >90 ML/MIN/1.73M*2
GLUCOSE SERPL-MCNC: 84 MG/DL (ref 74–99)
POTASSIUM SERPL-SCNC: 4.2 MMOL/L (ref 3.5–5.3)
SODIUM SERPL-SCNC: 137 MMOL/L (ref 136–145)

## 2025-01-20 PROCEDURE — 82306 VITAMIN D 25 HYDROXY: CPT

## 2025-01-20 PROCEDURE — 80048 BASIC METABOLIC PNL TOTAL CA: CPT

## 2025-01-20 PROCEDURE — 84702 CHORIONIC GONADOTROPIN TEST: CPT

## 2025-01-21 ENCOUNTER — TELEPHONE (OUTPATIENT)
Dept: PRIMARY CARE | Facility: CLINIC | Age: 35
End: 2025-01-21
Payer: COMMERCIAL

## 2025-01-21 DIAGNOSIS — N91.2 AMENORRHEA: Primary | ICD-10-CM

## 2025-01-21 DIAGNOSIS — Z34.90 ENCOUNTER FOR SUPERVISION OF NORMAL PREGNANCY, UNSPECIFIED, UNSPECIFIED TRIMESTER: Primary | ICD-10-CM

## 2025-01-21 LAB
25(OH)D3 SERPL-MCNC: 43 NG/ML (ref 30–100)
B-HCG SERPL-ACNC: 5049 MIU/ML

## 2025-01-21 ASSESSMENT — PROMIS GLOBAL HEALTH SCALE
RATE_SOCIAL_SATISFACTION: VERY GOOD
CARRYOUT_SOCIAL_ACTIVITIES: GOOD
CARRYOUT_PHYSICAL_ACTIVITIES: MOSTLY
RATE_QUALITY_OF_LIFE: VERY GOOD
RATE_AVERAGE_FATIGUE: MILD
RATE_AVERAGE_PAIN: 0
RATE_PHYSICAL_HEALTH: VERY GOOD
RATE_MENTAL_HEALTH: VERY GOOD
RATE_GENERAL_HEALTH: VERY GOOD
EMOTIONAL_PROBLEMS: OFTEN

## 2025-01-21 NOTE — TELEPHONE ENCOUNTER
>Relayed provider's message to patient    >Pt immediately got upset saying I do not understand that Sera Sandoval put the previous lab orders - I explained Sera was aware of this, but advised her OB should be placing this order, per Sera's instructions - She was not kind, and is requesting a direct call back from Sera.

## 2025-01-21 NOTE — TELEPHONE ENCOUNTER
"HCG lab test for pregnancy to \"check her levels\"  --  urine test was positive --- she is requesting this lab be added on from her draw that she had done yesterday   "

## 2025-01-21 NOTE — TELEPHONE ENCOUNTER
Spoke w/pt. OB unable to add order. Has repeat hcg for tomorrow. Will place order for serum hcg to be added on to yesterday's sample

## 2025-01-22 ENCOUNTER — LAB (OUTPATIENT)
Dept: LAB | Facility: LAB | Age: 35
End: 2025-01-22
Payer: COMMERCIAL

## 2025-01-22 DIAGNOSIS — Z34.90 ENCOUNTER FOR SUPERVISION OF NORMAL PREGNANCY, UNSPECIFIED, UNSPECIFIED TRIMESTER: ICD-10-CM

## 2025-01-22 LAB — B-HCG SERPL-ACNC: 4940 MIU/ML

## 2025-01-22 PROCEDURE — 84702 CHORIONIC GONADOTROPIN TEST: CPT

## 2025-01-23 ASSESSMENT — ENCOUNTER SYMPTOMS
ABDOMINAL PAIN: 0
BLOOD IN STOOL: 0
HEADACHES: 0
SHORTNESS OF BREATH: 0
LIGHT-HEADEDNESS: 0

## 2025-01-23 NOTE — PROGRESS NOTES
Subjective   Patient ID: Sima Argueta is a 34 y.o. female who presents for Annual Exam (Pt is here for physical / nr).    HPI   Here for CPE. She is 6 weeks pregnant. Had recent hcg testing that remained stable over 2 days. Saw OB who did US which confirmed yolk sac and possible heart flutter. Has upcoming FU 2/7.    Admits to some dizziness ongoing since November. She does have a hx of chronic L ear problems as well as TMJ (saw chiropracter and has had improvement in sxs).     HM: pap 5/17/24, hx of LEEP + cone biopsy    Review of Systems   HENT:  Negative for hearing loss.    Eyes:  Negative for visual disturbance.   Respiratory:  Negative for shortness of breath.    Cardiovascular:  Negative for chest pain.   Gastrointestinal:  Negative for abdominal pain, blood in stool, nausea and vomiting.   Genitourinary:  Positive for vaginal bleeding.   Neurological:  Positive for dizziness. Negative for light-headedness and headaches.       Objective   /62   Pulse 79   Wt 49.2 kg (108 lb 6.4 oz)   SpO2 98%   BMI 21.17 kg/m²     Physical Exam  Vitals reviewed.   Constitutional:       Appearance: Normal appearance.   HENT:      Head: Normocephalic and atraumatic.      Right Ear: Tympanic membrane and ear canal normal.      Left Ear: Ear canal normal.      Ears:      Comments: L serous effusion     Nose: Nose normal.      Mouth/Throat:      Mouth: Mucous membranes are moist.      Pharynx: No oropharyngeal exudate.   Eyes:      Extraocular Movements: Extraocular movements intact.      Conjunctiva/sclera: Conjunctivae normal.      Pupils: Pupils are equal, round, and reactive to light.   Cardiovascular:      Rate and Rhythm: Normal rate and regular rhythm.      Heart sounds: Normal heart sounds.   Pulmonary:      Effort: Pulmonary effort is normal.      Breath sounds: Normal breath sounds. No wheezing.   Abdominal:      General: There is no distension.      Palpations: Abdomen is soft.      Tenderness: There  is no abdominal tenderness.   Musculoskeletal:         General: No tenderness.      Cervical back: Normal range of motion and neck supple.   Skin:     General: Skin is warm and dry.      Findings: No rash.   Neurological:      General: No focal deficit present.      Mental Status: She is alert. Mental status is at baseline.   Psychiatric:         Mood and Affect: Mood normal.       Office Visit on 01/24/2025   Component Date Value Ref Range Status    POC Color, Urine 01/24/2025 Yellow  Straw, Yellow, Light-Yellow Final    POC Appearance, Urine 01/24/2025 Cloudy (A)  Clear Final    POC Glucose, Urine 01/24/2025 NEGATIVE  NEGATIVE mg/dl Final    POC Bilirubin, Urine 01/24/2025 NEGATIVE  NEGATIVE Final    POC Ketones, Urine 01/24/2025 NEGATIVE  NEGATIVE mg/dl Final    POC Specific Gravity, Urine 01/24/2025 1.020  1.005 - 1.035 Final    POC Blood, Urine 01/24/2025 TRACE-Intact (A)  NEGATIVE Final    POC PH, Urine 01/24/2025 8.5  No Reference Range Established PH Final    POC Protein, Urine 01/24/2025 NEGATIVE  NEGATIVE mg/dl Final    POC Urobilinogen, Urine 01/24/2025 0.2  0.2, 1.0 EU/DL Final    Poc Nitrite, Urine 01/24/2025 NEGATIVE  NEGATIVE Final    POC Leukocytes, Urine 01/24/2025 NEGATIVE  NEGATIVE Final   Lab on 01/22/2025   Component Date Value Ref Range Status    HCG, Beta-Quantitative 01/22/2025 4,940 (H)  <5 mIU/mL Final    Low-level positive HCG results can be seen in early pregnancy, in arlene- or post-menopausal females due to normal pituitary HCG production, or with analytic interference. Repeat testing in 48-72 hours can aid in assessing for pregnancy as results should double in this time period. FSH measurement is recommended in arlene- or post-menopausal females as concurrent elevation of FSH can support pituitary production as the source of the HCG elevation.      Lab on 01/20/2025   Component Date Value Ref Range Status    Glucose 01/20/2025 84  74 - 99 mg/dL Final    Sodium 01/20/2025 137  136 - 145  mmol/L Final    Potassium 01/20/2025 4.2  3.5 - 5.3 mmol/L Final    Chloride 01/20/2025 103  98 - 107 mmol/L Final    Bicarbonate 01/20/2025 27  21 - 32 mmol/L Final    Anion Gap 01/20/2025 11  10 - 20 mmol/L Final    Urea Nitrogen 01/20/2025 12  6 - 23 mg/dL Final    Creatinine 01/20/2025 0.64  0.50 - 1.05 mg/dL Final    eGFR 01/20/2025 >90  >60 mL/min/1.73m*2 Final    Calculations of estimated GFR are performed using the 2021 CKD-EPI Study Refit equation without the race variable for the IDMS-Traceable creatinine methods.  https://jasn.asnjournals.org/content/early/2021/09/22/ASN.8471913821    Calcium 01/20/2025 8.8  8.6 - 10.3 mg/dL Final    Vitamin D, 25-Hydroxy, Total 01/20/2025 43  30 - 100 ng/mL Final    HCG, Beta-Quantitative 01/20/2025 5,049 (H)  <5 mIU/mL Final    Low-level positive HCG results can be seen in early pregnancy, in arlene- or post-menopausal females due to normal pituitary HCG production, or with analytic interference. Repeat testing in 48-72 hours can aid in assessing for pregnancy as results should double in this time period. FSH measurement is recommended in arlene- or post-menopausal females as concurrent elevation of FSH can support pituitary production as the source of the HCG elevation.            Assessment/Plan   Problem List Items Addressed This Visit    None  Visit Diagnoses         Codes    Routine medical exam    -  Primary Z00.00    Relevant Orders    POCT UA Automated manually resulted (Completed)          Recommend flonase for sinus congestion likely contributing to vertiginous dizziness. If worsening, will refer to ENT.

## 2025-01-24 ENCOUNTER — OFFICE VISIT (OUTPATIENT)
Dept: PRIMARY CARE | Facility: CLINIC | Age: 35
End: 2025-01-24
Payer: COMMERCIAL

## 2025-01-24 VITALS
SYSTOLIC BLOOD PRESSURE: 110 MMHG | HEART RATE: 79 BPM | DIASTOLIC BLOOD PRESSURE: 62 MMHG | WEIGHT: 108.4 LBS | OXYGEN SATURATION: 98 % | BODY MASS INDEX: 21.17 KG/M2

## 2025-01-24 DIAGNOSIS — Z00.00 ROUTINE MEDICAL EXAM: Primary | ICD-10-CM

## 2025-01-24 PROBLEM — R52 GENERALIZED BODY ACHES: Status: RESOLVED | Noted: 2023-12-11 | Resolved: 2025-01-24

## 2025-01-24 PROBLEM — D62 ANEMIA DUE TO ACUTE BLOOD LOSS: Status: RESOLVED | Noted: 2023-12-11 | Resolved: 2025-01-24

## 2025-01-24 LAB
POC APPEARANCE, URINE: ABNORMAL
POC BILIRUBIN, URINE: NEGATIVE
POC BLOOD, URINE: ABNORMAL
POC COLOR, URINE: YELLOW
POC GLUCOSE, URINE: NEGATIVE MG/DL
POC KETONES, URINE: NEGATIVE MG/DL
POC LEUKOCYTES, URINE: NEGATIVE
POC NITRITE,URINE: NEGATIVE
POC PH, URINE: 8.5 PH
POC PROTEIN, URINE: NEGATIVE MG/DL
POC SPECIFIC GRAVITY, URINE: 1.02
POC UROBILINOGEN, URINE: 0.2 EU/DL

## 2025-01-24 PROCEDURE — 81003 URINALYSIS AUTO W/O SCOPE: CPT | Performed by: PHYSICIAN ASSISTANT

## 2025-01-24 PROCEDURE — 99395 PREV VISIT EST AGE 18-39: CPT | Performed by: PHYSICIAN ASSISTANT

## 2025-01-24 PROCEDURE — 1036F TOBACCO NON-USER: CPT | Performed by: PHYSICIAN ASSISTANT

## 2025-01-24 ASSESSMENT — COLUMBIA-SUICIDE SEVERITY RATING SCALE - C-SSRS
1. IN THE PAST MONTH, HAVE YOU WISHED YOU WERE DEAD OR WISHED YOU COULD GO TO SLEEP AND NOT WAKE UP?: NO
6. HAVE YOU EVER DONE ANYTHING, STARTED TO DO ANYTHING, OR PREPARED TO DO ANYTHING TO END YOUR LIFE?: NO
2. HAVE YOU ACTUALLY HAD ANY THOUGHTS OF KILLING YOURSELF?: NO

## 2025-01-24 ASSESSMENT — PATIENT HEALTH QUESTIONNAIRE - PHQ9
2. FEELING DOWN, DEPRESSED OR HOPELESS: NOT AT ALL
SUM OF ALL RESPONSES TO PHQ9 QUESTIONS 1 AND 2: 0
1. LITTLE INTEREST OR PLEASURE IN DOING THINGS: NOT AT ALL

## 2025-01-24 ASSESSMENT — ENCOUNTER SYMPTOMS
NAUSEA: 0
VOMITING: 0
DIZZINESS: 1

## 2025-01-24 ASSESSMENT — PAIN SCALES - GENERAL: PAINLEVEL_OUTOF10: 0-NO PAIN

## 2025-02-21 ENCOUNTER — APPOINTMENT (OUTPATIENT)
Dept: PRIMARY CARE | Facility: CLINIC | Age: 35
End: 2025-02-21
Payer: COMMERCIAL

## 2025-04-09 ENCOUNTER — OFFICE VISIT (OUTPATIENT)
Dept: PRIMARY CARE | Facility: CLINIC | Age: 35
End: 2025-04-09
Payer: COMMERCIAL

## 2025-04-09 VITALS
SYSTOLIC BLOOD PRESSURE: 114 MMHG | OXYGEN SATURATION: 99 % | DIASTOLIC BLOOD PRESSURE: 72 MMHG | WEIGHT: 108 LBS | BODY MASS INDEX: 21.09 KG/M2 | HEART RATE: 74 BPM

## 2025-04-09 DIAGNOSIS — O03.9 MISCARRIAGE (HHS-HCC): ICD-10-CM

## 2025-04-09 DIAGNOSIS — N89.8 VAGINAL DISCHARGE: ICD-10-CM

## 2025-04-09 DIAGNOSIS — K21.9 GASTRIC REFLUX: ICD-10-CM

## 2025-04-09 DIAGNOSIS — R13.19 ESOPHAGEAL DYSPHAGIA: ICD-10-CM

## 2025-04-09 DIAGNOSIS — R53.83 OTHER FATIGUE: ICD-10-CM

## 2025-04-09 DIAGNOSIS — R39.9 UTI SYMPTOMS: Primary | ICD-10-CM

## 2025-04-09 LAB
POC APPEARANCE, URINE: CLEAR
POC BILIRUBIN, URINE: NEGATIVE
POC BLOOD, URINE: ABNORMAL
POC COLOR, URINE: YELLOW
POC GLUCOSE, URINE: NEGATIVE MG/DL
POC KETONES, URINE: NEGATIVE MG/DL
POC LEUKOCYTES, URINE: NEGATIVE
POC NITRITE,URINE: NEGATIVE
POC PH, URINE: 7 PH
POC PROTEIN, URINE: ABNORMAL MG/DL
POC SPECIFIC GRAVITY, URINE: 1.02
POC UROBILINOGEN, URINE: 0.2 EU/DL

## 2025-04-09 PROCEDURE — 81003 URINALYSIS AUTO W/O SCOPE: CPT | Performed by: STUDENT IN AN ORGANIZED HEALTH CARE EDUCATION/TRAINING PROGRAM

## 2025-04-09 PROCEDURE — 99213 OFFICE O/P EST LOW 20 MIN: CPT | Performed by: STUDENT IN AN ORGANIZED HEALTH CARE EDUCATION/TRAINING PROGRAM

## 2025-04-09 PROCEDURE — 1036F TOBACCO NON-USER: CPT | Performed by: STUDENT IN AN ORGANIZED HEALTH CARE EDUCATION/TRAINING PROGRAM

## 2025-04-09 RX ORDER — LANSOPRAZOLE 30 MG/1
30 CAPSULE, DELAYED RELEASE ORAL
Qty: 90 CAPSULE | Refills: 1 | Status: SHIPPED | OUTPATIENT
Start: 2025-04-09

## 2025-04-09 ASSESSMENT — COLUMBIA-SUICIDE SEVERITY RATING SCALE - C-SSRS
1. IN THE PAST MONTH, HAVE YOU WISHED YOU WERE DEAD OR WISHED YOU COULD GO TO SLEEP AND NOT WAKE UP?: NO
2. HAVE YOU ACTUALLY HAD ANY THOUGHTS OF KILLING YOURSELF?: NO
6. HAVE YOU EVER DONE ANYTHING, STARTED TO DO ANYTHING, OR PREPARED TO DO ANYTHING TO END YOUR LIFE?: NO

## 2025-04-09 ASSESSMENT — ENCOUNTER SYMPTOMS
CHILLS: 0
SORE THROAT: 0
FREQUENCY: 0
FLANK PAIN: 0
FEVER: 0
ABDOMINAL PAIN: 0
HEMATURIA: 0
BACK PAIN: 0
NAUSEA: 0
ANOREXIA: 0
DIARRHEA: 0
DYSURIA: 0
VOMITING: 0
CONSTIPATION: 0
HEADACHES: 0

## 2025-04-09 ASSESSMENT — PATIENT HEALTH QUESTIONNAIRE - PHQ9
2. FEELING DOWN, DEPRESSED OR HOPELESS: NOT AT ALL
1. LITTLE INTEREST OR PLEASURE IN DOING THINGS: NOT AT ALL
SUM OF ALL RESPONSES TO PHQ9 QUESTIONS 1 AND 2: 0

## 2025-04-09 ASSESSMENT — PAIN SCALES - GENERAL: PAINLEVEL_OUTOF10: 0-NO PAIN

## 2025-04-09 NOTE — PROGRESS NOTES
Subjective   Patient ID: Sima Argueta is a 34 y.o. female who presents for UTI (Pt is here for frequent urination, not able to fully empty bladder, which started a week ago / nr).    UTI   Pertinent negatives include no chills, flank pain, frequency, hematuria, nausea, urgency or vomiting.   Female  Problem  The patient's primary symptoms include vaginal discharge. The patient's pertinent negatives include no genital itching, genital lesions, genital odor, genital rash, missed menses, pelvic pain or vaginal bleeding. This is a recurrent problem. The current episode started in the past 7 days. The problem occurs constantly. The problem has been waxing and waning. The pain is mild. The problem affects both sides. She is not pregnant. Pertinent negatives include no abdominal pain, anorexia, back pain, chills, constipation, diarrhea, discolored urine, dysuria, fever, flank pain, frequency, headaches, hematuria, joint pain, joint swelling, nausea, painful intercourse, rash, sore throat, urgency or vomiting. The vaginal discharge was normal. She has not been passing clots. She has not been passing tissue. The symptoms are aggravated by bowel movements, tactile pressure and urinating. She is sexually active. No, her partner does not have an STD. She uses nothing for contraception. Her menstrual history has been regular.     35 yo female here for UTI symptoms, vaginal itching  UTI symptoms  Increased urgency lately  Taking probiotic  2. Vaginal discharge  Is ovulating  Has some itching  3. Fatigue  Had recent miscarriage  Would like TSH checked  4. Acid Reflux  Would like lansoprazole refilled    Review of Systems   Constitutional:  Negative for chills and fever.   HENT:  Negative for sore throat.    Gastrointestinal:  Negative for abdominal pain, anorexia, constipation, diarrhea, nausea and vomiting.   Genitourinary:  Positive for vaginal discharge. Negative for dysuria, flank pain, frequency, hematuria, missed  menses, pelvic pain and urgency.   Musculoskeletal:  Negative for back pain and joint pain.   Skin:  Negative for rash.   Neurological:  Negative for headaches.     All pertinent positive symptoms are included in the history of present illness.    All other systems have been reviewed and are negative and noncontributory to this patient's current ailments.     Allergies   Allergen Reactions    Sulfamethoxazole-Trimethoprim Anaphylaxis and Diarrhea        Immunization History   Administered Date(s) Administered    HPV 9-valent vaccine (GARDASIL 9) 09/24/2010, 10/25/2010, 06/30/2023    HPV, Quadrivalent 09/24/2010, 10/25/2010    Influenza, Unspecified 09/28/2015       Objective   Vitals:    04/09/25 0818   BP: 114/72   Pulse: 74   SpO2: 99%   Weight: 49 kg (108 lb)       Physical Exam  CONSTITUTIONAL - well nourished, well developed, looks like stated age, in no acute distress  SKIN - normal skin color and pigmentation. No rash, lesions, or nodules visualized  HEAD - no trauma, normocephalic  EYES - extraocular muscles are intact  ENT - atraumatic  NECK - no neck mass was observed  LUNGS - CTA B/L  CARDIAC - regular rate and regular rhythm  ABDOMEN - no organomegaly, soft, nontender, nondistended  EXTREMITIES - no edema, no deformities  MSK - moves limbs equally  NEUROLOGICAL - alert, oriented and no focal signs  PSYCHIATRIC - alert, pleasant and cordial, age-appropriate  IMMUNOLOGIC - no cervical lymphadenopathy     Assessment/Plan   Problem List Items Addressed This Visit    None  Visit Diagnoses       UTI symptoms    -  Primary    Relevant Orders    POCT UA Automated manually resulted    Urine Culture        35 yo female here for UTI symptoms, vaginal itching  UTI symptoms  Send urine for culture  2. Vaginal discharge  Vaginitis swab  3. Fatigue  Lab work  4. Acid Reflux  Lansoprazole refill    RTC prn

## 2025-04-10 ENCOUNTER — TELEPHONE (OUTPATIENT)
Dept: PRIMARY CARE | Facility: CLINIC | Age: 35
End: 2025-04-10
Payer: COMMERCIAL

## 2025-04-10 DIAGNOSIS — O03.9 MISCARRIAGE (HHS-HCC): ICD-10-CM

## 2025-04-10 DIAGNOSIS — R53.83 OTHER FATIGUE: ICD-10-CM

## 2025-04-10 DIAGNOSIS — Z80.8 FAMILY HISTORY OF THYROID CANCER: Primary | ICD-10-CM

## 2025-04-10 LAB — BV SCORE VAG QL: NORMAL

## 2025-04-11 LAB — BACTERIA UR CULT: NORMAL

## 2025-04-17 LAB
BASOPHILS # BLD AUTO: 51 CELLS/UL (ref 0–200)
BASOPHILS NFR BLD AUTO: 1 %
EOSINOPHIL # BLD AUTO: 168 CELLS/UL (ref 15–500)
EOSINOPHIL NFR BLD AUTO: 3.3 %
ERYTHROCYTE [DISTWIDTH] IN BLOOD BY AUTOMATED COUNT: 12.2 % (ref 11–15)
HCT VFR BLD AUTO: 39.7 % (ref 35–45)
HGB BLD-MCNC: 13.1 G/DL (ref 11.7–15.5)
IRON SATN MFR SERPL: 31 % (CALC) (ref 16–45)
IRON SERPL-MCNC: 106 MCG/DL (ref 40–190)
LYMPHOCYTES # BLD AUTO: 1729 CELLS/UL (ref 850–3900)
LYMPHOCYTES NFR BLD AUTO: 33.9 %
MCH RBC QN AUTO: 29 PG (ref 27–33)
MCHC RBC AUTO-ENTMCNC: 33 G/DL (ref 32–36)
MCV RBC AUTO: 88 FL (ref 80–100)
MONOCYTES # BLD AUTO: 311 CELLS/UL (ref 200–950)
MONOCYTES NFR BLD AUTO: 6.1 %
NEUTROPHILS # BLD AUTO: 2841 CELLS/UL (ref 1500–7800)
NEUTROPHILS NFR BLD AUTO: 55.7 %
PLATELET # BLD AUTO: 336 THOUSAND/UL (ref 140–400)
PMV BLD REES-ECKER: 10.2 FL (ref 7.5–12.5)
RBC # BLD AUTO: 4.51 MILLION/UL (ref 3.8–5.1)
TIBC SERPL-MCNC: 346 MCG/DL (CALC) (ref 250–450)
TSH SERPL-ACNC: 0.99 MIU/L
VIT B12 SERPL-MCNC: 711 PG/ML (ref 200–1100)
WBC # BLD AUTO: 5.1 THOUSAND/UL (ref 3.8–10.8)

## 2025-04-20 LAB
T3REVERSE SERPL-MCNC: 12 NG/DL (ref 8–25)
T4 FREE SERPL-MCNC: 1 NG/DL (ref 0.8–1.8)
THYROGLOB AB SERPL-ACNC: <1 IU/ML
THYROPEROXIDASE AB SERPL-ACNC: <1 IU/ML

## 2025-04-21 ENCOUNTER — TELEPHONE (OUTPATIENT)
Dept: PRIMARY CARE | Facility: CLINIC | Age: 35
End: 2025-04-21
Payer: COMMERCIAL

## 2025-04-22 NOTE — TELEPHONE ENCOUNTER
Spoke to patient regarding results.  She verbally understood but she is still concerned with her levels.  Appointment scheduled 04/29/25 to discuss.

## 2025-04-29 ENCOUNTER — TELEMEDICINE (OUTPATIENT)
Dept: PRIMARY CARE | Facility: CLINIC | Age: 35
End: 2025-04-29
Payer: COMMERCIAL

## 2025-04-29 VITALS — HEIGHT: 60 IN | WEIGHT: 108 LBS | BODY MASS INDEX: 21.2 KG/M2

## 2025-04-29 DIAGNOSIS — O03.9 MISCARRIAGE (HHS-HCC): Primary | ICD-10-CM

## 2025-04-29 DIAGNOSIS — Z63.4 BEREAVEMENT: ICD-10-CM

## 2025-04-29 PROCEDURE — 99213 OFFICE O/P EST LOW 20 MIN: CPT | Performed by: STUDENT IN AN ORGANIZED HEALTH CARE EDUCATION/TRAINING PROGRAM

## 2025-04-29 PROCEDURE — 1036F TOBACCO NON-USER: CPT | Performed by: STUDENT IN AN ORGANIZED HEALTH CARE EDUCATION/TRAINING PROGRAM

## 2025-04-29 PROCEDURE — 3008F BODY MASS INDEX DOCD: CPT | Performed by: STUDENT IN AN ORGANIZED HEALTH CARE EDUCATION/TRAINING PROGRAM

## 2025-04-29 RX ORDER — METHYLPREDNISOLONE 4 MG/1
TABLET ORAL
COMMUNITY
Start: 2025-04-27

## 2025-04-29 RX ORDER — IBUPROFEN 800 MG/1
TABLET ORAL
COMMUNITY
Start: 2025-04-27

## 2025-04-29 RX ORDER — PROMETHAZINE HYDROCHLORIDE 12.5 MG/1
TABLET ORAL
COMMUNITY
Start: 2025-04-27

## 2025-04-29 RX ORDER — AZITHROMYCIN 250 MG/1
TABLET, FILM COATED ORAL
COMMUNITY
Start: 2025-04-27

## 2025-04-29 SDOH — SOCIAL STABILITY - SOCIAL INSECURITY: DISSAPEARANCE AND DEATH OF FAMILY MEMBER: Z63.4

## 2025-04-29 ASSESSMENT — PATIENT HEALTH QUESTIONNAIRE - PHQ9
1. LITTLE INTEREST OR PLEASURE IN DOING THINGS: NOT AT ALL
SUM OF ALL RESPONSES TO PHQ9 QUESTIONS 1 AND 2: 0
2. FEELING DOWN, DEPRESSED OR HOPELESS: NOT AT ALL

## 2025-04-29 ASSESSMENT — PAIN SCALES - GENERAL: PAINLEVEL_OUTOF10: 0-NO PAIN

## 2025-04-29 NOTE — PROGRESS NOTES
Subjective   Patient ID: Sima Argueta is a 34 y.o. female who presents virtually for Results (Patient would like to discuss lab results).    HPI  33 yo female here for follow up after miscarriage  Miscarriage  Pt going through a hard time emotionally  Difficulty sleeping  Fatigue, frequent illness, crying frequently  Would like some time off of work to recover    Review of Systems  All pertinent positive symptoms are included in the history of present illness.    All other systems have been reviewed and are negative and noncontributory to this patient's current ailments.     Allergies[1]     Immunization History   Administered Date(s) Administered    HPV 9-valent vaccine (GARDASIL 9) 09/24/2010, 10/25/2010, 06/30/2023    HPV, Quadrivalent 09/24/2010, 10/25/2010    Influenza, Unspecified 09/28/2015       Objective   Vitals:    04/29/25 1350   Weight: 49 kg (108 lb)   Height: 1.524 m (5')       Physical Exam Virtual visit  CONSTITUTIONAL - In no acute distress  CHEST - non labored breathing  PSYCHIATRIC - alert, pleasant and cordial, age-appropriate    Assessment/Plan   33 yo female here for follow up after miscarriage  Miscarriage  Labs reviewed  Will fill out paperwork for work leave    RTC prn       [1]   Allergies  Allergen Reactions    Sulfamethoxazole-Trimethoprim Anaphylaxis and Diarrhea

## 2025-05-05 ENCOUNTER — APPOINTMENT (OUTPATIENT)
Dept: INTEGRATIVE MEDICINE | Facility: CLINIC | Age: 35
End: 2025-05-05
Payer: COMMERCIAL

## 2025-05-05 DIAGNOSIS — F41.9 ANXIETY: Primary | ICD-10-CM

## 2025-05-05 DIAGNOSIS — O03.9 MISCARRIAGE (HHS-HCC): ICD-10-CM

## 2025-05-05 PROCEDURE — 99417 PROLNG OP E/M EACH 15 MIN: CPT | Performed by: INTERNAL MEDICINE

## 2025-05-05 PROCEDURE — 99215 OFFICE O/P EST HI 40 MIN: CPT | Performed by: INTERNAL MEDICINE

## 2025-05-05 ASSESSMENT — ENCOUNTER SYMPTOMS
DYSURIA: 0
WEAKNESS: 0
FEVER: 0
NERVOUS/ANXIOUS: 1
CONSTIPATION: 0
BACK PAIN: 0
SHORTNESS OF BREATH: 0
SLEEP DISTURBANCE: 1
DIFFICULTY URINATING: 0
MYALGIAS: 0
FATIGUE: 0
DIARRHEA: 1
COUGH: 0
ARTHRALGIAS: 0
HEADACHES: 0
APPETITE CHANGE: 0

## 2025-05-05 NOTE — PATIENT INSTRUCTIONS
Keep taking vitamin D daily.   Work on stress.   3. Exercise regularly. Start with 30 minutes per day and work up gradually overtime.   4.  Try happy light in the morning for 20-30 minutes.   5. Start vitamin b12 supplement 500 mcg as a sublingual supplement in the morning.   6 Focus a bit more on fruits and vegetables at every meals. Aim for six to ten servings per day.  7. Keep a gratitude journal for 30 days.     Follow up in 3 months.   Nirali Hackett MD PhD

## 2025-05-05 NOTE — Clinical Note
I asked her to come back in 3 months. I would prefer that at least one visit be in person. So if her next could be in person that would be best. Can you reach out to her?

## 2025-05-05 NOTE — PROGRESS NOTES
Integrative Medicine Visit:     Subjective   Patient ID: Sima Argueta is a 34 y.o. female who presents for No chief complaint on file.       HPI  Miscarriage: had a misscarriage in January 2025. She is worried that she did something to cause this and wants to have more labs done to determine why this caused. Has faustino had one misscarriage. No trouble conceiving the first time either. (Has 4 year old child).      Takes vitamin d 5000 International Units by mouth daily.   Struggling with anxiety. She has a four year old with speech delay. She is also under pressure at work and may be getting laid off.  Trouble with sleep.   Periods are regular. Lasts 3-4 days. No heavy. Minimal cramping. Has not been on birth control for ten years.   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   CONCERNS:  wants to understand why she had a misscarriage and if it could be her thyroid causing it.     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   Anxiety; used to take medicine but it did not help as much.   FamMH:  father had thyroid cancer.     SOC:  [employer]. [employer]. Four year old son. .     NUTRITION: she consumes 8 ounce cup of coffee first thing in the morning. No caffeeine after noon. Drinks mostly water.   Dinner: chicken thighs mashed potatoes and red onion, pepper and pineapple. Salad  Lunch: yogurt and fruit - strawberries and blueberries and water  Breakfast: piece of sourdough toast with avocado and an egg and hot sauce and a yogurt.     Smoking:  non-smoker    Alcohol use:  a glass of wine once a month or less.     Exercise:  she gets no exercise and knows it is part of it. She feels better when she does more rigorous exercise. Has a kettle bell and does some strength exercises with it. Likes on line video cardio class.   Has a [employer] membership to try.     SLEEP: terrible sleep. Not sleeping well. Worries a lot at night.     STRESS MANAGEMENT: doing some stress management techniques. Sits  outside and breathes.   SUPPORT: .     Review of Systems   Constitutional:  Negative for appetite change, fatigue and fever.   HENT:  Negative for congestion and hearing loss.    Eyes:  Negative for visual disturbance.   Respiratory:  Negative for cough and shortness of breath.    Cardiovascular:  Negative for chest pain and leg swelling.   Gastrointestinal:  Positive for diarrhea (rare not daily). Negative for constipation.   Genitourinary:  Negative for difficulty urinating, dysuria and urgency.   Musculoskeletal:  Negative for arthralgias, back pain and myalgias.   Skin:  Positive for rash (acne).   Neurological:  Negative for weakness and headaches.   Psychiatric/Behavioral:  Positive for sleep disturbance. Negative for behavioral problems and suicidal ideas. The patient is nervous/anxious.             Pain:    Objective   There were no vitals taken for this visit.      Physical Exam  HENT:      Head: Normocephalic and atraumatic.      Mouth/Throat:      Mouth: Mucous membranes are moist.   Eyes:      Extraocular Movements: Extraocular movements intact.   Cardiovascular:      Rate and Rhythm: Normal rate.   Pulmonary:      Effort: Pulmonary effort is normal. No respiratory distress.   Musculoskeletal:         General: No swelling or deformity.      Cervical back: Normal range of motion.   Skin:     General: Skin is dry.      Findings: No rash.      Comments: No rash seen on video- could not see her acne on video.    Neurological:      General: No focal deficit present.      Mental Status: She is alert and oriented to person, place, and time.   Psychiatric:         Mood and Affect: Mood normal.         Thought Content: Thought content normal.      Comments: Normal affect                      Assessment/Plan     Problem List Items Addressed This Visit           ICD-10-CM    Anxiety - Primary F41.9    Relevant Orders    Referral to [facility]     Other Visit Diagnoses         Codes      Miscarriage  ([facility])     O03.9    Relevant Orders    Referral to [facility]            Reviewed very normal thyroid labs. Do not suspect this is why she had miscarriage. Discussed how common it is for women to have at least one misscarriage in their fertility journey. Work on stress management with the techniques she is using, increase fruits, vegetables, beans in her diet. Increase exercise.   Iron levels good on recentl labs  Vitamin d good in January 2025- very surprised that she requires vitamin d 5000 International Units to keep her level normal.   Start vitamin b12 500 mg as sublingual tablet daily to help with energy and perhaps sleep/ anxiety   Discussed gratitude journal.   Trial of acupuncture to support healthy fertilty and anxiety. Referral made to [facility].   Happy light may help with circadian cycel and in turn help with sleep.   Recommend Follow up in : 3 months.   Keep taking vitamin D daily.   Work on stress.   3. Exercise regularly. Start with 30 minutes per day and work up gradually overtime.   4.  Try happy light in the morning for 20-30 minutes.   5. Start vitamin b12 supplement 500 mcg as a sublingual supplement in the morning.   6 Focus a bit more on fruits and vegetables at every meals. Aim for six to ten servings per day.  7. Keep a gratitude journal for 30 days.   Nirali Hackett MD PhD    Time Spent  Prep time on day of patient encounter: 3 minutes  Time spent directly with patient, family or caregiver: 55 minutes  Additional Time Spent on Patient Care Activities: 0 minutes  Documentation Time: 5 minutes  Other Time Spent: 0 minutes  Total: 63 minutes

## 2025-05-19 ENCOUNTER — TELEMEDICINE (OUTPATIENT)
Dept: PRIMARY CARE | Facility: CLINIC | Age: 35
End: 2025-05-19
Payer: COMMERCIAL

## 2025-05-19 DIAGNOSIS — O03.9 MISCARRIAGE (HHS-HCC): Primary | ICD-10-CM

## 2025-05-19 DIAGNOSIS — Z63.4 BEREAVEMENT: ICD-10-CM

## 2025-05-19 PROCEDURE — 1036F TOBACCO NON-USER: CPT | Performed by: STUDENT IN AN ORGANIZED HEALTH CARE EDUCATION/TRAINING PROGRAM

## 2025-05-19 PROCEDURE — 99213 OFFICE O/P EST LOW 20 MIN: CPT | Performed by: STUDENT IN AN ORGANIZED HEALTH CARE EDUCATION/TRAINING PROGRAM

## 2025-05-19 SDOH — SOCIAL STABILITY - SOCIAL INSECURITY: DISSAPEARANCE AND DEATH OF FAMILY MEMBER: Z63.4

## 2025-05-19 NOTE — PROGRESS NOTES
Subjective   Patient ID: Sima Argueta is a 34 y.o. female who presents virtually for FMLA paper work.    HPI  33 yo female here to complete FMLA paperwork after having multiple miscarriages  Miscarriage  Hx of 2 recent miscarriages  Having worsening mental health due to miscarriages  Would like some time off work to recover and get more behavioral health help  Trying to get an appointment with infertility specialist    Review of Systems  All pertinent positive symptoms are included in the history of present illness.    All other systems have been reviewed and are negative and noncontributory to this patient's current ailments.     Allergies[1]     Immunization History   Administered Date(s) Administered    HPV 9-valent vaccine (GARDASIL 9) 09/24/2010, 10/25/2010, 06/30/2023    HPV, Quadrivalent 09/24/2010, 10/25/2010    Influenza, Unspecified 09/28/2015       Objective   There were no vitals filed for this visit.    Physical Exam Virtual visit  CONSTITUTIONAL - In no acute distress  ENT - Atraumatic  Skin - no rashes visualized  CHEST - non labored breathing  PSYCHIATRIC - alert, pleasant and cordial, age-appropriate    Assessment/Plan   33 yo female here to complete FMLA paperwork after having multiple miscarriages  Miscarriage  FMLA paperwork completed  Continue mental health therapy   Establish with infertility specialist    RTC as needed       [1]   Allergies  Allergen Reactions    Sulfamethoxazole-Trimethoprim Anaphylaxis and Diarrhea

## 2025-05-21 ENCOUNTER — OFFICE VISIT (OUTPATIENT)
Dept: URGENT CARE | Age: 35
End: 2025-05-21
Payer: COMMERCIAL

## 2025-05-21 VITALS
SYSTOLIC BLOOD PRESSURE: 132 MMHG | HEIGHT: 60 IN | HEART RATE: 68 BPM | BODY MASS INDEX: 20.42 KG/M2 | RESPIRATION RATE: 18 BRPM | OXYGEN SATURATION: 99 % | DIASTOLIC BLOOD PRESSURE: 74 MMHG | WEIGHT: 104 LBS | TEMPERATURE: 97.9 F

## 2025-05-21 DIAGNOSIS — J01.40 ACUTE PANSINUSITIS, RECURRENCE NOT SPECIFIED: Primary | ICD-10-CM

## 2025-05-21 RX ORDER — DOXYCYCLINE 100 MG/1
100 CAPSULE ORAL 2 TIMES DAILY
Qty: 20 CAPSULE | Refills: 0 | Status: SHIPPED | OUTPATIENT
Start: 2025-05-21 | End: 2025-05-31

## 2025-05-21 RX ORDER — GUAIFENESIN AND PSEUDOEPHEDRINE HCL 1200; 120 MG/1; MG/1
1 TABLET, EXTENDED RELEASE ORAL 2 TIMES DAILY
Qty: 20 TABLET | Refills: 0 | Status: SHIPPED | OUTPATIENT
Start: 2025-05-21

## 2025-05-21 RX ORDER — LEVOCETIRIZINE DIHYDROCHLORIDE 5 MG/1
5 TABLET, FILM COATED ORAL EVERY EVENING
Qty: 30 TABLET | Refills: 0 | Status: SHIPPED | OUTPATIENT
Start: 2025-05-21 | End: 2026-05-21

## 2025-05-21 ASSESSMENT — ENCOUNTER SYMPTOMS
HEADACHES: 1
SINUS COMPLAINT: 1

## 2025-05-21 NOTE — PROGRESS NOTES
Subjective   Patient ID: Sima Argueta is a 34 y.o. female. They present today with a chief complaint of Sinus Problem, Earache, Headache, and Generalized Body Aches (1 month ,teeth hurt , took a z pack just finished it / steroids /  mucinex ).    History of Present Illness  Patient is a pleasant 84-year-old white female, no significant past medical history, presented to clinic with chief complaint of upper respiratory congestion.  Patient reported approximately 1 month history of persistent severe sinus pain and pressure over her frontal and ethmoid sinuses.  She has taken a Z-Bryon as well as amoxicillin with minimal improvement.  She denies any cough or sputum production.  She does report bilateral ear pressure as well.  No chest pain or shortness of breath.  No abdominal pain, nausea, vomiting.  No numbness tingling or focal weakness.        Sinus Problem  Associated symptoms: ear pain and headaches    Earache   Associated symptoms include headaches.   Headache  Associated symptoms: ear pain        Past Medical History  Allergies as of 05/21/2025 - Reviewed 05/21/2025   Allergen Reaction Noted    Sulfamethoxazole-trimethoprim Anaphylaxis and Diarrhea 04/09/2018       Prescriptions Prior to Admission[1]       Medical History[2]    Surgical History[3]     reports that she has never smoked. She has never used smokeless tobacco. She reports that she does not drink alcohol and does not use drugs.    Review of Systems  Review of Systems   HENT:  Positive for ear pain.    Neurological:  Positive for headaches.                                  Objective    Vitals:    05/21/25 1606   BP: 132/74   Pulse: 68   Resp: 18   Temp: 36.6 °C (97.9 °F)   TempSrc: Oral   SpO2: 99%   Weight: 47.2 kg (104 lb)   Height: 1.524 m (5')     No LMP recorded.    Physical Exam  General: Vitals Noted. No distress. Normocephalic.     HEENT: TMs normal, EOMI, normal conjunctiva, patent nares with erythematous edematous and appear nasal  turbinates and clear rhinorrhea bilaterally.  Posterior oropharynx with signs of postnasal drainage without any erythema swelling or tonsillar exudate.  Uvula is in the midline and nonedematous.  No drooling.  No trismus.  Positive tenderness to palpation and percussion over bilateral frontal and ethmoid sinuses with no overlying skin changes.    Neck: Supple with no adenopathy.     Cardiac: Regular Rate and Rhythm. No murmur.     Pulmonary: Equal breath sounds bilaterally. No wheezes, rhonchi, or rales.    Abdomen: Soft, non-tender, with normal bowel sounds.     Musculoskeletal: Moves all extremities, no effusion, no edema.     Skin: No obvious rashes.  Procedures    Point of Care Test & Imaging Results from this visit    Imaging  No results found.    Cardiology, Vascular, and Other Imaging  No other imaging results found for the past 2 days      Diagnostic study results (if any) were reviewed by Aamir Harper PA-C.    Assessment/Plan   Allergies, medications, history, and pertinent labs/EKGs/Imaging reviewed by Aamir Harper PA-C.     Medical Decision Making  Patient was seen eval in the clinic with complaint of sinus pain and pressure.  On exam patient is nontoxic well-appearing respite comfortably no acute distress.  Vital signs are stable, afebrile.  Chest clear, heart is regular, belly is obese and soft and nontender.  ENT exam as above concerning for acute sinusitis.  Will treat with 10-day course of doxycycline and advise she initiate Mucinex D and Flonase.  Also advise Xyzal.  Discharged home at this time.  She has follow-up with ENT next month and advised to keep this appointment.  Reviewed my impression, plan, strict return was report to ED precautions with the patient.  She expresses understanding and agreement with plan of care.    Orders and Diagnoses  Diagnoses and all orders for this visit:  Acute pansinusitis, recurrence not specified  -     pseudoephedrine-guaifenesin (Mucinex D  Maximum Strength) 120-1,200 mg tablet extended release 12 hr; Take 1 tablet by mouth 2 times a day.  -     doxycycline (Vibramycin) 100 mg capsule; Take 1 capsule (100 mg) by mouth 2 times a day for 10 days. Take with at least 8 ounces (large glass) of water, do not lie down for 30 minutes after        Medical Admin Record      Follow Up Instructions  No follow-ups on file.    Patient disposition: Home    Electronically signed by Aamir Harper PA-C  4:39 PM           [1] (Not in a hospital admission)  [2]   Past Medical History:  Diagnosis Date    Acute candidiasis of vulva and vagina 07/21/2016    Vaginal candidiasis    Acute pharyngitis, unspecified 11/04/2013    Sore throat    Anemia due to acute blood loss 12/11/2023    At risk for bleeding 12/26/2023    Dehydration 12/11/2023    Delivery normal 12/11/2023    Encounter for initial prescription of contraceptive pills 07/21/2016    Visit for oral contraceptive prescription    Hemorrhage of anus and rectum 06/12/2013    Rectal hemorrhage    History of COVID-19 12/11/2023    Nausea 02/26/2014    Nausea    Personal history of other diseases of the respiratory system 12/30/2013    History of influenza    Personal history of other infectious and parasitic diseases 06/13/2013    History of viral illness    Personal history of other specified conditions 02/26/2014    History of fatigue    Postpartum care and examination ([facility]) 12/26/2023    Tinnitus, right ear 05/10/2016    Right-sided tinnitus    Unspecified condition associated with female genital organs and menstrual cycle 07/21/2016    Vaginal burning   [3] History reviewed. No pertinent surgical history.

## 2025-06-10 ENCOUNTER — APPOINTMENT (OUTPATIENT)
Dept: OTOLARYNGOLOGY | Facility: CLINIC | Age: 35
End: 2025-06-10
Payer: COMMERCIAL

## 2025-06-10 VITALS — TEMPERATURE: 97.1 F | WEIGHT: 106 LBS | BODY MASS INDEX: 20.81 KG/M2 | HEIGHT: 60 IN

## 2025-06-10 DIAGNOSIS — J01.90 ACUTE NON-RECURRENT SINUSITIS, UNSPECIFIED LOCATION: Primary | ICD-10-CM

## 2025-06-10 PROCEDURE — 3008F BODY MASS INDEX DOCD: CPT | Performed by: OTOLARYNGOLOGY

## 2025-06-10 PROCEDURE — 99214 OFFICE O/P EST MOD 30 MIN: CPT | Performed by: OTOLARYNGOLOGY

## 2025-06-10 PROCEDURE — 1036F TOBACCO NON-USER: CPT | Performed by: OTOLARYNGOLOGY

## 2025-06-10 NOTE — PROGRESS NOTES
Chief Complaint   Patient presents with    Follow-up     Follow up  sinus check     HPI:  Sima Argueta is a 34 y.o. female who the spring starting at about March had 2 episodes of some upper respiratory infection.  She was urgent care was told she had left ear infection and fluid in the ear and was treated with Flonase and a decongestant.   Fortunately with that she is feeling better back to normal.  She was told that she had fluid behind the left eardrum.  Currently no otologic or sinus symptoms.  Had similar problems in the spring 2024.  Normal audiogram at that time.    PMH:  Past Medical History:   Diagnosis Date    Acute candidiasis of vulva and vagina 07/21/2016    Vaginal candidiasis    Acute pharyngitis, unspecified 11/04/2013    Sore throat    Anemia due to acute blood loss 12/11/2023    At risk for bleeding 12/26/2023    Dehydration 12/11/2023    Delivery normal 12/11/2023    Encounter for initial prescription of contraceptive pills 07/21/2016    Visit for oral contraceptive prescription    Hemorrhage of anus and rectum 06/12/2013    Rectal hemorrhage    History of COVID-19 12/11/2023    Nausea 02/26/2014    Nausea    Personal history of other diseases of the respiratory system 12/30/2013    History of influenza    Personal history of other infectious and parasitic diseases 06/13/2013    History of viral illness    Personal history of other specified conditions 02/26/2014    History of fatigue    [facility] care and examination ([facility]) 12/26/2023    Tinnitus, right ear 05/10/2016    Right-sided tinnitus    Unspecified condition associated with female genital organs and menstrual cycle 07/21/2016    Vaginal burning     History reviewed. No pertinent surgical history.      Medications:     Current Outpatient Medications:     ergocalciferol (Vitamin D-2) 1.25 MG (15367 UT) capsule, Take 1 capsule (50,000 Units) by mouth 1 (one) time per week., Disp: , Rfl:     ibuprofen 800 mg tablet, TAKE 1 TABLET  BY MOUTH THREE TIMES DAILY FOR 7 DAYS AS NEEDED, Disp: , Rfl:     lansoprazole (Prevacid) 30 mg DR capsule, Take 1 capsule (30 mg) by mouth once daily., Disp: 90 capsule, Rfl: 1    promethazine (Phenergan) 12.5 mg tablet, TAKE 1 TABLET BY MOUTH FOUR TIMES DAILY FOR 4 DAYS AS DIRECTED, Disp: , Rfl:     pseudoephedrine-guaifenesin (Mucinex D Maximum Strength) 120-1,200 mg tablet extended release 12 hr, Take 1 tablet by mouth 2 times a day., Disp: 20 tablet, Rfl: 0    azithromycin (Zithromax) 250 mg tablet, , Disp: , Rfl:     fluticasone (Flonase Sensimist) 27.5 mcg/actuation nasal spray, Administer 1 spray into each nostril once daily., Disp: 10 g, Rfl: 2    levocetirizine (Xyzal) 5 mg tablet, Take 1 tablet (5 mg) by mouth once daily in the evening. (Patient not taking: Reported on 6/10/2025), Disp: 30 tablet, Rfl: 0    methylPREDNISolone (Medrol Dospak) 4 mg tablets, follow package directions (Patient not taking: Reported on 6/10/2025), Disp: , Rfl:     multivitamin tablet, Take 1 tablet by mouth once daily., Disp: , Rfl:      Allergies:  Allergies   Allergen Reactions    Sulfamethoxazole-Trimethoprim Anaphylaxis and Diarrhea        ROS:  Review of systems normal unless stated otherwise in the HPI and/or PMH.    Physical Exam:  Temperature 36.2 °C (97.1 °F), height 1.524 m (5'), weight 48.1 kg (106 lb), unknown if currently breastfeeding. Body mass index is 20.7 kg/m².     GENERAL APPEARANCE: Well developed and well nourished.  Alert and oriented in no acute distress.  Normal vocal quality.      HEAD/FACE: No erythema or edema or facial tenderness.  Normal facial nerve function bilaterally.    EAR:       EXTERNAL: Normal pinnas and external auditory canals without lesion or obstructing wax.       MIDDLE EAR: Tympanic membranes intact and mobile with normal landmarks.  Middle ear space appears well aerated.       TUBE STATUS: N/A       MASTOID CAVITY: N/A       HEARING: Gross hearing assessment is within normal  limits.      NOSE:       VISUALIZED USING: Anterior rhinoscopy with headlight and nasal speculum.       DORSUM: Midline, nontraumatic appearance.       MUCOSA: Normal-appearing.       SECRETIONS: Normal.       SEPTUM: Midline and nonobstructing.       INFERIOR TURBINATES: Normal.       MIDDLE TURBINATES/MEATUS: N/A       BLEEDING: N/A         ORAL CAVITY/PHARYNX:       TEETH: Adequate dentition.       TONGUE: No mass or lesion.  Normal mobility.       FLOOR OF MOUTH: No mass or lesion.       PALATE: Normal hard palate, soft palate, and uvula.       OROPHARYNX: Normal without mass or lesion.       BUCCAL MUCOSA/GBS: Normal without mass or lesion.       LIPS: Normal.    LARYNX/HYPOPHARYNX/NASOPHARYNX: N/A    NECK: No palpable masses or abnormal adenopathy.  Trachea is midline.    THYROID: No thyromegaly or palpable nodule.    SALIVARY GLANDS: Normal bilateral parotid and submandibular glands by inspection and palpation.    TMJ's: Normal.    NEURO: Cranial nerve exam grossly normal bilaterally.       Assessment/Plan   Sima was seen today for follow-up.  Diagnoses and all orders for this visit:  Acute non-recurrent sinusitis, unspecified location (Primary)    Glad she is feeling better.  Okay to continue Flonase if she feels like it is helping her.  Reassured her exam is back to normal.  Follow-up with any changes  Follow up if symptoms worsen or fail to improve.     Rajat Duran MD

## 2025-06-13 ENCOUNTER — APPOINTMENT (OUTPATIENT)
Dept: OTOLARYNGOLOGY | Facility: CLINIC | Age: 35
End: 2025-06-13
Payer: COMMERCIAL

## 2025-07-30 ENCOUNTER — OFFICE VISIT (OUTPATIENT)
Dept: PRIMARY CARE | Facility: CLINIC | Age: 35
End: 2025-07-30
Payer: COMMERCIAL

## 2025-07-30 VITALS
WEIGHT: 101 LBS | OXYGEN SATURATION: 98 % | SYSTOLIC BLOOD PRESSURE: 120 MMHG | HEART RATE: 68 BPM | DIASTOLIC BLOOD PRESSURE: 78 MMHG | BODY MASS INDEX: 19.73 KG/M2

## 2025-07-30 DIAGNOSIS — O03.9 MISCARRIAGE (HHS-HCC): ICD-10-CM

## 2025-07-30 DIAGNOSIS — F41.8 DEPRESSION WITH ANXIETY: Primary | ICD-10-CM

## 2025-07-30 PROCEDURE — 99214 OFFICE O/P EST MOD 30 MIN: CPT | Performed by: FAMILY MEDICINE

## 2025-07-30 ASSESSMENT — ENCOUNTER SYMPTOMS
VOMITING: 0
NERVOUS/ANXIOUS: 1
CONSTIPATION: 0
CHILLS: 0
CONFUSION: 0
FEVER: 0
WHEEZING: 0
WEAKNESS: 0
NAUSEA: 0
PALPITATIONS: 0
DIZZINESS: 0
FREQUENCY: 0
FATIGUE: 1
SHORTNESS OF BREATH: 0

## 2025-07-30 ASSESSMENT — PAIN SCALES - GENERAL: PAINLEVEL_OUTOF10: 0-NO PAIN

## 2025-07-30 NOTE — PROGRESS NOTES
Subjective   Patient ID: Sima Argueta is a 34 y.o. female who presents for discuss depression.    35 yo female here to complete FMLA paperwork after having multiple miscarriages  1. Miscarriage  Hx of 2 recent miscarriages  Having worsening mental health due to miscarriages  Would like some time off work to recover and get more behavioral health help  Trying to get an appointment with infertility specialist, she has not seen anyone yet.  She plans to get back to work on 8/18/25 - needs paperwork sent to have her off until then.    Needs short term disability papers filled out for until she returns to work 8/19/25           Review of Systems   Constitutional:  Positive for fatigue. Negative for chills and fever.   Respiratory:  Negative for shortness of breath and wheezing.    Cardiovascular:  Negative for chest pain and palpitations.   Gastrointestinal:  Negative for constipation, nausea and vomiting.   Genitourinary:  Negative for frequency.   Skin:  Negative for rash.   Neurological:  Negative for dizziness and weakness.   Psychiatric/Behavioral:  Negative for confusion. The patient is nervous/anxious.        Objective   /78   Pulse 68   Wt 45.8 kg (101 lb)   SpO2 98%   BMI 19.73 kg/m²     Physical Exam  Vitals reviewed.   Constitutional:       General: She is not in acute distress.     Appearance: Normal appearance. She is not ill-appearing.     Cardiovascular:      Rate and Rhythm: Normal rate and regular rhythm.      Heart sounds: Normal heart sounds. No murmur heard.  Pulmonary:      Breath sounds: Normal breath sounds. No wheezing or rhonchi.   Abdominal:      General: Bowel sounds are normal.      Tenderness: There is no abdominal tenderness.     Skin:     Findings: No rash.     Neurological:      General: No focal deficit present.      Mental Status: She is alert and oriented to person, place, and time.     Psychiatric:         Mood and Affect: Mood normal.         Behavior: Behavior  normal.         Assessment/Plan   Problem List Items Addressed This Visit    None  Visit Diagnoses         Codes      Depression with anxiety    -  Primary F41.8    declines meds at this time       Miscarriage ([facility])     O03.9    still in the process of trying to get pregnant - continue with a healthy diet, good hydration and appropriate sleep

## 2025-09-22 ENCOUNTER — APPOINTMENT (OUTPATIENT)
Facility: CLINIC | Age: 35
End: 2025-09-22
Payer: COMMERCIAL